# Patient Record
Sex: MALE | Race: WHITE | NOT HISPANIC OR LATINO | Employment: OTHER | URBAN - METROPOLITAN AREA
[De-identification: names, ages, dates, MRNs, and addresses within clinical notes are randomized per-mention and may not be internally consistent; named-entity substitution may affect disease eponyms.]

---

## 2023-09-14 PROBLEM — R79.89 ELEVATED LIVER FUNCTION TESTS: Status: ACTIVE | Noted: 2023-09-14

## 2023-09-14 PROBLEM — E78.5 HLD (HYPERLIPIDEMIA): Status: ACTIVE | Noted: 2023-09-14

## 2023-09-14 PROBLEM — E11.3299 MILD NONPROLIFERATIVE DIABETIC RETINOPATHY WITHOUT MACULAR EDEMA ASSOCIATED WITH TYPE 2 DIABETES MELLITUS (MULTI): Status: ACTIVE | Noted: 2023-09-14

## 2023-09-14 PROBLEM — I65.29 INTERNAL CAROTID ARTERY STENOSIS: Status: ACTIVE | Noted: 2023-09-14

## 2023-09-14 PROBLEM — I48.91 ATRIAL FIBRILLATION (MULTI): Status: ACTIVE | Noted: 2023-09-14

## 2023-09-14 PROBLEM — K22.70 BARRETT ESOPHAGUS: Status: ACTIVE | Noted: 2023-09-14

## 2023-09-14 PROBLEM — I87.2 VENOUS INSUFFICIENCY: Status: ACTIVE | Noted: 2023-09-14

## 2023-09-14 PROBLEM — E55.9 VITAMIN D DEFICIENCY: Status: ACTIVE | Noted: 2023-09-14

## 2023-09-14 PROBLEM — E11.51 DIABETES MELLITUS WITH PERIPHERAL ARTERY DISEASE (MULTI): Status: ACTIVE | Noted: 2023-09-14

## 2023-09-14 PROBLEM — I83.90 VARICOSE VEIN OF LEG: Status: ACTIVE | Noted: 2023-09-14

## 2023-09-14 PROBLEM — E80.6 HYPERBILIRUBINEMIA: Status: ACTIVE | Noted: 2023-09-14

## 2023-09-14 PROBLEM — I87.8 VENOUS STASIS: Status: ACTIVE | Noted: 2023-09-14

## 2023-09-14 PROBLEM — I77.9 CAROTID ARTERY DISEASE (CMS-HCC): Status: ACTIVE | Noted: 2023-09-14

## 2023-09-14 PROBLEM — G47.33 OSA ON CPAP: Status: ACTIVE | Noted: 2023-09-14

## 2023-09-14 PROBLEM — D64.9 ANEMIA: Status: ACTIVE | Noted: 2023-09-14

## 2023-09-14 PROBLEM — M10.9 GOUT: Status: ACTIVE | Noted: 2023-09-14

## 2023-09-14 PROBLEM — I07.1 TRICUSPID VALVE REGURGITATION: Status: ACTIVE | Noted: 2023-09-14

## 2023-09-14 PROBLEM — I27.20 PULMONARY HYPERTENSION (MULTI): Status: ACTIVE | Noted: 2023-09-14

## 2023-09-14 PROBLEM — I50.30 DIASTOLIC HEART FAILURE (MULTI): Status: ACTIVE | Noted: 2023-09-14

## 2023-09-14 PROBLEM — M10.9 GOUTY BURSITIS OF LEFT OLECRANON: Status: ACTIVE | Noted: 2023-09-14

## 2023-09-14 PROBLEM — K70.30 ALCOHOLIC CIRRHOSIS (MULTI): Status: ACTIVE | Noted: 2023-09-14

## 2023-09-14 PROBLEM — Z99.89 USE OF CANE AS AMBULATORY AID: Status: ACTIVE | Noted: 2023-09-14

## 2023-09-14 PROBLEM — I34.0 CHRONIC MITRAL VALVE REGURGITATION: Status: ACTIVE | Noted: 2023-09-14

## 2023-09-14 PROBLEM — M10.9 GOUTY BURSITIS OF RIGHT OLECRANON: Status: ACTIVE | Noted: 2023-09-14

## 2023-09-14 PROBLEM — I10 HYPERTENSION: Status: ACTIVE | Noted: 2023-09-14

## 2023-09-14 RX ORDER — LISINOPRIL 40 MG/1
1 TABLET ORAL DAILY
COMMUNITY
End: 2024-05-31 | Stop reason: SDUPTHER

## 2023-09-14 RX ORDER — ATORVASTATIN CALCIUM 40 MG/1
1 TABLET, FILM COATED ORAL DAILY
COMMUNITY
End: 2024-05-31 | Stop reason: SDUPTHER

## 2023-09-14 RX ORDER — CHOLECALCIFEROL (VITAMIN D3) 50 MCG
1 TABLET ORAL DAILY
COMMUNITY

## 2023-09-14 RX ORDER — MULTIVITAMIN
1 TABLET ORAL DAILY
COMMUNITY

## 2023-09-14 RX ORDER — GLIPIZIDE AND METFORMIN HCL 5; 500 MG/1; MG/1
1 TABLET, FILM COATED ORAL 2 TIMES DAILY
COMMUNITY
End: 2023-12-08

## 2023-09-14 RX ORDER — METOPROLOL SUCCINATE 200 MG/1
1 TABLET, EXTENDED RELEASE ORAL DAILY
COMMUNITY
End: 2024-02-13 | Stop reason: SDUPTHER

## 2023-09-14 RX ORDER — COLCHICINE 0.6 MG/1
1 TABLET ORAL DAILY
COMMUNITY
End: 2024-04-15 | Stop reason: SDUPTHER

## 2023-09-14 RX ORDER — ALLOPURINOL 100 MG/1
1 TABLET ORAL DAILY
COMMUNITY
End: 2023-12-05

## 2023-09-14 RX ORDER — WARFARIN SODIUM 5 MG/1
1 TABLET ORAL
COMMUNITY
End: 2023-12-30 | Stop reason: SDUPTHER

## 2023-09-14 RX ORDER — SPIRONOLACTONE 25 MG/1
1 TABLET ORAL DAILY
COMMUNITY
End: 2023-12-08

## 2023-09-14 RX ORDER — FUROSEMIDE 20 MG/1
1 TABLET ORAL DAILY
COMMUNITY
End: 2024-05-31 | Stop reason: SDUPTHER

## 2023-09-14 RX ORDER — PANTOPRAZOLE SODIUM 40 MG/1
1 TABLET, DELAYED RELEASE ORAL
COMMUNITY
End: 2024-01-31

## 2023-10-16 ENCOUNTER — OFFICE VISIT (OUTPATIENT)
Dept: PRIMARY CARE | Facility: CLINIC | Age: 68
End: 2023-10-16
Payer: MEDICARE

## 2023-10-16 VITALS
HEART RATE: 86 BPM | SYSTOLIC BLOOD PRESSURE: 172 MMHG | WEIGHT: 265.9 LBS | HEIGHT: 70 IN | TEMPERATURE: 97.2 F | BODY MASS INDEX: 38.07 KG/M2 | DIASTOLIC BLOOD PRESSURE: 93 MMHG | OXYGEN SATURATION: 97 %

## 2023-10-16 DIAGNOSIS — I48.11 LONGSTANDING PERSISTENT ATRIAL FIBRILLATION (MULTI): ICD-10-CM

## 2023-10-16 DIAGNOSIS — F10.10 ETOH ABUSE: ICD-10-CM

## 2023-10-16 DIAGNOSIS — I27.20 PULMONARY HYPERTENSION (MULTI): ICD-10-CM

## 2023-10-16 DIAGNOSIS — E11.51 DIABETES MELLITUS WITH PERIPHERAL ARTERY DISEASE (MULTI): ICD-10-CM

## 2023-10-16 DIAGNOSIS — Z12.11 COLON CANCER SCREENING: Primary | ICD-10-CM

## 2023-10-16 LAB
POC INR: 4.2
POC PROTHROMBIN TIME: NORMAL

## 2023-10-16 PROCEDURE — 99214 OFFICE O/P EST MOD 30 MIN: CPT | Performed by: NURSE PRACTITIONER

## 2023-10-16 PROCEDURE — 82570 ASSAY OF URINE CREATININE: CPT

## 2023-10-16 PROCEDURE — 3062F POS MACROALBUMINURIA REV: CPT | Performed by: NURSE PRACTITIONER

## 2023-10-16 PROCEDURE — 1036F TOBACCO NON-USER: CPT | Performed by: NURSE PRACTITIONER

## 2023-10-16 PROCEDURE — 85610 PROTHROMBIN TIME: CPT | Performed by: NURSE PRACTITIONER

## 2023-10-16 PROCEDURE — 82043 UR ALBUMIN QUANTITATIVE: CPT

## 2023-10-16 PROCEDURE — 3080F DIAST BP >= 90 MM HG: CPT | Performed by: NURSE PRACTITIONER

## 2023-10-16 PROCEDURE — 1159F MED LIST DOCD IN RCRD: CPT | Performed by: NURSE PRACTITIONER

## 2023-10-16 PROCEDURE — 4010F ACE/ARB THERAPY RXD/TAKEN: CPT | Performed by: NURSE PRACTITIONER

## 2023-10-16 PROCEDURE — 3077F SYST BP >= 140 MM HG: CPT | Performed by: NURSE PRACTITIONER

## 2023-10-16 PROCEDURE — 1170F FXNL STATUS ASSESSED: CPT | Performed by: NURSE PRACTITIONER

## 2023-10-16 PROCEDURE — 1160F RVW MEDS BY RX/DR IN RCRD: CPT | Performed by: NURSE PRACTITIONER

## 2023-10-16 RX ORDER — FOLIC ACID 0.8 MG
0.8 TABLET ORAL DAILY
COMMUNITY

## 2023-10-16 RX ORDER — WARFARIN 2 MG/1
4 TABLET ORAL EVERY EVENING
Qty: 60 TABLET | Refills: 0 | Status: SHIPPED | OUTPATIENT
Start: 2023-10-16 | End: 2023-11-08

## 2023-10-16 ASSESSMENT — ENCOUNTER SYMPTOMS
ABDOMINAL DISTENTION: 0
EYE ITCHING: 0
CHILLS: 0
EYE PAIN: 0
WHEEZING: 0
PALPITATIONS: 0
COUGH: 0
NUMBNESS: 0
HEMATURIA: 0
CONSTIPATION: 0
DIARRHEA: 0
DYSURIA: 0
FREQUENCY: 0
ARTHRALGIAS: 0
SHORTNESS OF BREATH: 0
NERVOUS/ANXIOUS: 0
ABDOMINAL PAIN: 0
WOUND: 0
UNEXPECTED WEIGHT CHANGE: 0
ACTIVITY CHANGE: 0
VOMITING: 0
APPETITE CHANGE: 0
FEVER: 0

## 2023-10-16 ASSESSMENT — ACTIVITIES OF DAILY LIVING (ADL)
DRESSING: INDEPENDENT
MANAGING_FINANCES: INDEPENDENT
TAKING_MEDICATION: INDEPENDENT
DOING_HOUSEWORK: INDEPENDENT
BATHING: INDEPENDENT
GROCERY_SHOPPING: INDEPENDENT

## 2023-10-16 ASSESSMENT — PATIENT HEALTH QUESTIONNAIRE - PHQ9
SUM OF ALL RESPONSES TO PHQ9 QUESTIONS 1 AND 2: 0
1. LITTLE INTEREST OR PLEASURE IN DOING THINGS: NOT AT ALL
2. FEELING DOWN, DEPRESSED OR HOPELESS: NOT AT ALL

## 2023-10-16 NOTE — PATIENT INSTRUCTIONS
Obtain Liver US   Decrease Alcohol intake   Establish w cardiologist and pulmonologist   Monitor Blood pressure at home and call me with result on Friday.   Please return in 2 weeks for INR check

## 2023-10-16 NOTE — ASSESSMENT & PLAN NOTE
Managed on pantoprazole 40 mg daily, may take an extra dose if heartburn symptoms persist.  Advised by GI doctor to elevate the head of bed 6 to 8 inches, have an early dinner at least 3 hours before sleep, eat small frequent meals, avoid laying down after meals, avoid spices juices soda tomatoes ry oranges caffeine, chocolate, alcohol, NSAIDs, smoking, fatty foods, peppermint  Plan for EGD once patient receives clearance from cardiology and pulmonary

## 2023-10-16 NOTE — ASSESSMENT & PLAN NOTE
Chronic, stable  -Managed on warfarin-warfarin monitored monthly in office  -Referred to cardiology

## 2023-10-16 NOTE — ASSESSMENT & PLAN NOTE
7/23 carotid artery ultrasound completed-Mild calcified plaques at the proximal internal carotid arteries  bilaterally but without hemodynamic significance. no further follow-up needed.

## 2023-10-16 NOTE — PROGRESS NOTES
Subjective   Reason for Visit: Ricardo Chew is an 68 y.o. male here for a Medicare Wellness visit. Upon review patient states he already completed Medicare wellness visit w Health insurance provider therefore this visit will be a follow up of DM and Afib (warfarin management) and  which he is due for as well.      Ricardo continues on glipizide/Metformin for glucose control.  Is well managed on this regimen.  He does not monitor BS at home.  Ricardo was seen by Optho 6/27/23 and established w podiatrist Dr Ger Vega 9/28/23.    Ambulates w walker. Lives in Assisted living.  No falls. Participates in exercise at [facility].      Since last visit Ricardo was evaluated by GI for Barrets esophagus.  He was started on PPI and plans to complete EGD once he receives clearance from Cardiology and Pulmonology (referrals placed previously by GI but Ricardo had not completed yet).  Ricardo is actively establishing with providers in the area but has moved recently from [place] and is slightly overwhelmed w many healthcare appts.    He also had cataract surgery this summer.     Review of Systems   Constitutional:  Negative for activity change (ambulates w walker), appetite change, chills, fever and unexpected weight change.   HENT:  Negative for congestion.    Eyes:  Negative for pain and itching.   Respiratory:  Negative for cough, shortness of breath and wheezing.    Cardiovascular:  Negative for chest pain, palpitations and leg swelling.   Gastrointestinal:  Negative for abdominal distention, abdominal pain, constipation, diarrhea and vomiting.   Genitourinary:  Negative for dysuria, frequency, hematuria and urgency.        Nocturia once or twice- chronic   Musculoskeletal:  Negative for arthralgias and gait problem.   Skin:  Negative for rash and wound.   Neurological:  Negative for numbness.   Psychiatric/Behavioral:  The patient is not nervous/anxious.        Objective   Vitals:  BP (!) 172/93   Pulse 86   Temp 36.2 °C  "(97.2 °F)   Ht 1.778 m (5' 10\")   Wt 121 kg (265 lb 14.4 oz)   SpO2 97%   BMI 38.15 kg/m²       Physical Exam  Vitals and nursing note reviewed.   Constitutional:       Appearance: Normal appearance. He is obese.   HENT:      Head: Normocephalic and atraumatic.      Nose: Nose normal.      Mouth/Throat:      Mouth: Mucous membranes are moist.      Pharynx: Oropharynx is clear.   Eyes:      Extraocular Movements: Extraocular movements intact.      Conjunctiva/sclera: Conjunctivae normal.      Pupils: Pupils are equal, round, and reactive to light.   Cardiovascular:      Rate and Rhythm: Normal rate. Rhythm irregular.      Pulses: Normal pulses.      Heart sounds: Normal heart sounds.   Pulmonary:      Effort: Pulmonary effort is normal.      Breath sounds: Normal breath sounds.   Abdominal:      General: Bowel sounds are normal.      Palpations: Abdomen is soft.      Tenderness: There is no abdominal tenderness.   Musculoskeletal:         General: Normal range of motion.      Right elbow: Deformity present.      Left elbow: Deformity present.      Cervical back: Neck supple.      Comments:  +1B LE edema, B shins w dark discolorationn (venous stasis), B LE w vericose veins. No open areas. R heel callus- intact. no open lesion on B feet, Gouty bursitis B olecranon    Skin:     General: Skin is warm and dry.   Neurological:      General: No focal deficit present.      Mental Status: He is alert and oriented to person, place, and time. Mental status is at baseline.   Psychiatric:         Mood and Affect: Mood normal. Affect is flat.         Behavior: Behavior normal.         Thought Content: Thought content normal.         Judgment: Judgment normal.       Anticoagulation Episode Summary       Current INR goal:     TTR:  --   Next INR check:  10/30/2023   INR from last check:     Weekly max warfarin dose:     Target end date:     INR check location:     Preferred lab:     Send INR reminders to:         Comments:      "          Anticoagulation Monitoring Last Week Total Next Week Total Sun Mon Tue Wed Thu Fri Sat   10/16/2023 0 mg 24 mg 4 mg 4 mg 4 mg 4 mg 4 mg 4 mg 4 mg     Anticoagulation Monitoring Visit Report   10/16/2023 Report      Assessment/Plan   Problem List Items Addressed This Visit       Atrial fibrillation (CMS/HCC)    Overview     -afib diagnosed 2008  -6/28/2018 Pharm Stress test -normal cardiac nuclear perfusion study except for fixed defect likely due to attenuation artifact.  Normal left ventricular systolic function EF 75%         Current Assessment & Plan     Chronic, stable  -Managed on warfarin-warfarin monitored monthly in office  -Referred to cardiology         Relevant Medications    warfarin (Coumadin) 2 mg tablet    Other Relevant Orders    Referral to Cardiology    POCT INR manually resulted (Completed)    Diabetes mellitus with peripheral artery disease (CMS/HCC)    Current Assessment & Plan     Chronic, stable on current medication glipizide/metformin  -Seen by Optho 6/27/23  -Referred to podiatry- Ger Snell 9/28/23, next visit 11/30  -A1c: 7/17/23  5.5   Microalbumin- ordered today          Relevant Orders    Albumin , Urine Random (Completed)    Hemoglobin A1C    POCT INR manually resulted (Completed)    Pulmonary hypertension (CMS/HCC)    Relevant Orders    Incentive spirometry Instruct    Referral to Pulmonology    POCT INR manually resulted (Completed)    Colon cancer screening - Primary    Overview     Cologkenrick 8/2023-unremarkable         Relevant Orders    POCT INR manually resulted (Completed)    ETOH abuse    Overview     Alcohol cessation advised, referred to an alcoholic cessation program by Dr. Mateo Nicole           Current Assessment & Plan     Continues on multivitamin, thiamine, folic acid         Relevant Orders    POCT INR manually resulted (Completed)

## 2023-10-16 NOTE — ASSESSMENT & PLAN NOTE
Chronic, stable on current medication glipizide/metformin  -Seen by Optho 6/27/23  -Referred to podiatry- Ger Snell 9/28/23, next visit 11/30  -A1c: 7/17/23  5.5   Microalbumin- ordered today

## 2023-10-17 LAB
CREAT UR-MCNC: 45.8 MG/DL (ref 20–370)
MICROALBUMIN UR-MCNC: 342 MG/L
MICROALBUMIN/CREAT UR: 746.7 UG/MG CREAT

## 2023-10-20 DIAGNOSIS — R80.9 ALBUMINURIA: Primary | ICD-10-CM

## 2023-10-20 DIAGNOSIS — E11.51 DIABETES MELLITUS WITH PERIPHERAL ARTERY DISEASE (MULTI): ICD-10-CM

## 2023-10-26 ENCOUNTER — OFFICE VISIT (OUTPATIENT)
Dept: CARDIOLOGY | Facility: CLINIC | Age: 68
End: 2023-10-26
Payer: MEDICARE

## 2023-10-26 VITALS
TEMPERATURE: 97.3 F | WEIGHT: 267 LBS | BODY MASS INDEX: 39.55 KG/M2 | SYSTOLIC BLOOD PRESSURE: 132 MMHG | HEART RATE: 85 BPM | HEIGHT: 69 IN | DIASTOLIC BLOOD PRESSURE: 88 MMHG

## 2023-10-26 DIAGNOSIS — E78.2 MIXED HYPERLIPIDEMIA: ICD-10-CM

## 2023-10-26 DIAGNOSIS — I48.11 LONGSTANDING PERSISTENT ATRIAL FIBRILLATION (MULTI): Primary | ICD-10-CM

## 2023-10-26 DIAGNOSIS — I34.0 CHRONIC MITRAL VALVE REGURGITATION: ICD-10-CM

## 2023-10-26 PROCEDURE — 1126F AMNT PAIN NOTED NONE PRSNT: CPT | Performed by: INTERNAL MEDICINE

## 2023-10-26 PROCEDURE — 1159F MED LIST DOCD IN RCRD: CPT | Performed by: INTERNAL MEDICINE

## 2023-10-26 PROCEDURE — 1160F RVW MEDS BY RX/DR IN RCRD: CPT | Performed by: INTERNAL MEDICINE

## 2023-10-26 PROCEDURE — 4010F ACE/ARB THERAPY RXD/TAKEN: CPT | Performed by: INTERNAL MEDICINE

## 2023-10-26 PROCEDURE — 1036F TOBACCO NON-USER: CPT | Performed by: INTERNAL MEDICINE

## 2023-10-26 PROCEDURE — 99204 OFFICE O/P NEW MOD 45 MIN: CPT | Performed by: INTERNAL MEDICINE

## 2023-10-26 PROCEDURE — 3062F POS MACROALBUMINURIA REV: CPT | Performed by: INTERNAL MEDICINE

## 2023-10-26 PROCEDURE — 3075F SYST BP GE 130 - 139MM HG: CPT | Performed by: INTERNAL MEDICINE

## 2023-10-26 PROCEDURE — 3079F DIAST BP 80-89 MM HG: CPT | Performed by: INTERNAL MEDICINE

## 2023-10-26 ASSESSMENT — PAIN SCALES - GENERAL: PAINLEVEL: 0-NO PAIN

## 2023-10-26 NOTE — PROGRESS NOTES
"1.  Atrial fibrillation ?  2.  Hypertension  3.  Diabetes mellitus  4.  Hyperlipidemia  5.  Obesity  6.  Alcoholic cirrhosis    Patient is a pleasant 68-year-old male with the above-noted pertinent past medical history was been referred by the primary care physician for assessment of atrial fibrillation and hypertension prior for cardiovascular risk stratification for EGD, he has no complaints of palpitation, he denies any complaints of orthopnea PND or syncopal episode    Past surgical history  Bilateral cataract extraction 2023    Family medical history  Father  66 years of age Parkinson's disease and mother  94 years of age \"natural causes \"    Social history   lives in an assisted living situation has no children and has no history of smoking and endorses 3-4 alcoholic beverages per week    Allergies list reviewed as noted in the chart    Medication list is reviewed as noted in the chart    Review of system last eye examination in 2023 at which time he underwent cataract extraction, he consumes 1 cup of coffee per day, has on the average 1 restaurant meals per month, he walks with the help of the walker, all other systems were reviewed and negative for complaints    On physical examination BMI is elevated at 39.4, blood pressure 132/88 mmHg and heart rate of 85 bpm, patient is a well-developed well-nourished obese gentleman in no acute distress speaking full sentence, no carotid bruits upstroke and volumes are normal, heart rate and rhythm irregular frequent ectopic beats versus atrial fibrillation is present, S1 and S2 are normal intensity soft grade 2/6 systolic murmur best right upper and left upper sternal border present, lungs decreased breath sound but clear abdomen is morbidly obese positive bowel sounds soft and nontender    Twelve-lead EKG pending    68-year-old male with the above-noted pertinent history question of atrial fibrillation and further assessment " of hypertension posed by the primary care physician today I will obtain twelve-lead EKG for assessment of cardiac rhythm patient would benefit from echocardiogram for assessment of systolic and diastolic function as well as chamber sizes, patient is also scheduled to have a 24-hour Holter monitor for assessment of the total burden of atrial fibrillation  Heart healthy diet low-sodium diet and abstaining from alcohol use was discussed and recommendations were made  Overweight heart healthy diet and weight loss recommended  Return to my clinic after above tests are completed.

## 2023-10-31 ENCOUNTER — CLINICAL SUPPORT (OUTPATIENT)
Dept: PRIMARY CARE | Facility: CLINIC | Age: 68
End: 2023-10-31
Payer: MEDICARE

## 2023-10-31 DIAGNOSIS — I48.11 LONGSTANDING PERSISTENT ATRIAL FIBRILLATION (MULTI): ICD-10-CM

## 2023-10-31 LAB
POC INR: 1.8
POC PROTHROMBIN TIME: NORMAL

## 2023-11-07 ENCOUNTER — HOSPITAL ENCOUNTER (OUTPATIENT)
Dept: CARDIOLOGY | Facility: CLINIC | Age: 68
Discharge: HOME | End: 2023-11-07
Payer: MEDICARE

## 2023-11-07 ENCOUNTER — APPOINTMENT (OUTPATIENT)
Dept: CARDIOLOGY | Facility: CLINIC | Age: 68
End: 2023-11-07
Payer: MEDICARE

## 2023-11-07 ENCOUNTER — ANCILLARY PROCEDURE (OUTPATIENT)
Dept: CARDIOLOGY | Facility: CLINIC | Age: 68
End: 2023-11-07
Payer: MEDICARE

## 2023-11-07 VITALS
HEIGHT: 69 IN | SYSTOLIC BLOOD PRESSURE: 138 MMHG | BODY MASS INDEX: 39.55 KG/M2 | DIASTOLIC BLOOD PRESSURE: 82 MMHG | WEIGHT: 267 LBS

## 2023-11-07 DIAGNOSIS — I34.0 CHRONIC MITRAL VALVE REGURGITATION: ICD-10-CM

## 2023-11-07 DIAGNOSIS — I48.11 LONGSTANDING PERSISTENT ATRIAL FIBRILLATION (MULTI): ICD-10-CM

## 2023-11-07 DIAGNOSIS — E78.2 MIXED HYPERLIPIDEMIA: ICD-10-CM

## 2023-11-07 PROCEDURE — 93306 TTE W/DOPPLER COMPLETE: CPT | Performed by: INTERNAL MEDICINE

## 2023-11-07 PROCEDURE — 93306 TTE W/DOPPLER COMPLETE: CPT

## 2023-11-07 PROCEDURE — 93227 XTRNL ECG REC<48 HR R&I: CPT | Performed by: INTERNAL MEDICINE

## 2023-11-08 DIAGNOSIS — I48.11 LONGSTANDING PERSISTENT ATRIAL FIBRILLATION (MULTI): ICD-10-CM

## 2023-11-08 PROBLEM — E11.9 DIABETES MELLITUS (MULTI): Status: ACTIVE | Noted: 2023-07-28

## 2023-11-08 PROBLEM — D84.81 IMMUNODEFICIENCY DUE TO CONDITIONS CLASSIFIED ELSEWHERE (MULTI): Status: ACTIVE | Noted: 2023-11-08

## 2023-11-08 PROBLEM — I48.91 A-FIB (MULTI): Status: ACTIVE | Noted: 2023-07-28

## 2023-11-08 LAB
AORTIC VALVE MEAN GRADIENT: 11
AORTIC VALVE PEAK VELOCITY: 2.28
AV PEAK GRADIENT: 20.8
AVA (PEAK VEL): 1.49
AVA (VTI): 1.48
EJECTION FRACTION APICAL 4 CHAMBER: 58.8
LEFT VENTRICLE INTERNAL DIMENSION DIASTOLE: 4.8 (ref 3.5–6)
LEFT VENTRICULAR OUTFLOW TRACT DIAMETER: 2
MITRAL VALVE E/E' RATIO: 11.5
RIGHT VENTRICLE PEAK SYSTOLIC PRESSURE: 57.2

## 2023-11-08 RX ORDER — WARFARIN 2 MG/1
TABLET ORAL
Qty: 180 TABLET | Refills: 1 | Status: SHIPPED | OUTPATIENT
Start: 2023-11-08 | End: 2024-01-03 | Stop reason: SDUPTHER

## 2023-11-14 ENCOUNTER — CLINICAL SUPPORT (OUTPATIENT)
Dept: PRIMARY CARE | Facility: CLINIC | Age: 68
End: 2023-11-14
Payer: MEDICARE

## 2023-11-14 DIAGNOSIS — I48.11 LONGSTANDING PERSISTENT ATRIAL FIBRILLATION (MULTI): ICD-10-CM

## 2023-11-14 LAB
POC INR: 1.5
POC PROTHROMBIN TIME: NORMAL

## 2023-11-14 NOTE — PROGRESS NOTES
Here for INR, takes 4mg daily. INR was 1.5, per Patricia take 4.5 mg daily return to office in two weeks

## 2023-11-17 ENCOUNTER — OFFICE VISIT (OUTPATIENT)
Dept: NEPHROLOGY | Facility: CLINIC | Age: 68
End: 2023-11-17
Payer: MEDICARE

## 2023-11-17 VITALS
SYSTOLIC BLOOD PRESSURE: 182 MMHG | BODY MASS INDEX: 39.25 KG/M2 | WEIGHT: 265 LBS | HEART RATE: 83 BPM | DIASTOLIC BLOOD PRESSURE: 95 MMHG | TEMPERATURE: 97.2 F | HEIGHT: 69 IN

## 2023-11-17 DIAGNOSIS — E08.22 DIABETES MELLITUS DUE TO UNDERLYING CONDITION WITH DIABETIC CHRONIC KIDNEY DISEASE, UNSPECIFIED CKD STAGE, UNSPECIFIED WHETHER LONG TERM INSULIN USE (MULTI): Primary | ICD-10-CM

## 2023-11-17 DIAGNOSIS — E55.9 VITAMIN D DEFICIENCY: ICD-10-CM

## 2023-11-17 DIAGNOSIS — E11.51 DIABETES MELLITUS WITH PERIPHERAL ARTERY DISEASE (MULTI): ICD-10-CM

## 2023-11-17 DIAGNOSIS — R80.9 ALBUMINURIA: ICD-10-CM

## 2023-11-17 DIAGNOSIS — I10 ESSENTIAL HYPERTENSION: ICD-10-CM

## 2023-11-17 PROCEDURE — 3080F DIAST BP >= 90 MM HG: CPT | Performed by: INTERNAL MEDICINE

## 2023-11-17 PROCEDURE — 1126F AMNT PAIN NOTED NONE PRSNT: CPT | Performed by: INTERNAL MEDICINE

## 2023-11-17 PROCEDURE — 3066F NEPHROPATHY DOC TX: CPT | Performed by: INTERNAL MEDICINE

## 2023-11-17 PROCEDURE — 4010F ACE/ARB THERAPY RXD/TAKEN: CPT | Performed by: INTERNAL MEDICINE

## 2023-11-17 PROCEDURE — 1160F RVW MEDS BY RX/DR IN RCRD: CPT | Performed by: INTERNAL MEDICINE

## 2023-11-17 PROCEDURE — 99204 OFFICE O/P NEW MOD 45 MIN: CPT | Performed by: INTERNAL MEDICINE

## 2023-11-17 PROCEDURE — 3062F POS MACROALBUMINURIA REV: CPT | Performed by: INTERNAL MEDICINE

## 2023-11-17 PROCEDURE — 1036F TOBACCO NON-USER: CPT | Performed by: INTERNAL MEDICINE

## 2023-11-17 PROCEDURE — 1159F MED LIST DOCD IN RCRD: CPT | Performed by: INTERNAL MEDICINE

## 2023-11-17 PROCEDURE — 3077F SYST BP >= 140 MM HG: CPT | Performed by: INTERNAL MEDICINE

## 2023-11-17 RX ORDER — AMLODIPINE BESYLATE 5 MG/1
5 TABLET ORAL DAILY
Qty: 90 TABLET | Refills: 3 | Status: SHIPPED | OUTPATIENT
Start: 2023-11-17 | End: 2024-01-31 | Stop reason: SDUPTHER

## 2023-11-17 RX ORDER — DAPAGLIFLOZIN 10 MG/1
10 TABLET, FILM COATED ORAL DAILY
Qty: 90 TABLET | Refills: 3 | Status: SHIPPED | OUTPATIENT
Start: 2023-11-17 | End: 2024-11-16

## 2023-11-17 ASSESSMENT — PATIENT HEALTH QUESTIONNAIRE - PHQ9
SUM OF ALL RESPONSES TO PHQ9 QUESTIONS 1 AND 2: 0
2. FEELING DOWN, DEPRESSED OR HOPELESS: NOT AT ALL
1. LITTLE INTEREST OR PLEASURE IN DOING THINGS: NOT AT ALL

## 2023-11-17 NOTE — PROGRESS NOTES
Ricardo Chew   68 y.o.      Vitals:    11/17/23 1305   Weight: 120 kg (265 lb)      MRN/Room: [id number]/Room/bed info not found    Proteinuria  History Of Present Illness  Ricardo Chew is a 68 y.o. male presenting with proteinuria.     Past Medical History  He has no past medical history on file.    Surgical History  He has a past surgical history that includes Cataract extraction.     Social History  He reports that he quit smoking about 50 years ago. His smoking use included cigarettes. He smoked an average of 1 pack per day. He has never been exposed to tobacco smoke. He has never used smokeless tobacco. He reports current alcohol use. He reports that he does not use drugs.    Family History  Family History   Problem Relation Name Age of Onset    Hypertension Father      Heart failure Father      Parkinsonism Father      Other (Cardiac Disorder) Brother      Other (Cardiac Pacemaker) Brother          Allergies  Ketorolac                  Meds:         @[email or web address]@    Cannot display prior to admission medications because the patient has not been admitted in this contact.     Current Outpatient Medications   Medication Sig Dispense Refill    allopurinol (Zyloprim) 100 mg tablet Take 1 tablet (100 mg) by mouth once daily. Take 1 tablet daily      atorvastatin (Lipitor) 40 mg tablet Take 1 tablet (40 mg) by mouth once daily. TAKE 1 TABLET Before meals      cholecalciferol (Vitamin D-3) 50 MCG (2000 UT) tablet Take 1 tablet (2,000 Units) by mouth once daily.      colchicine, gout, 0.6 mg tablet Take 1 tablet (0.6 mg) by mouth once daily. Take 1 tablet daily      folic acid (Folvite) 800 mcg tablet Take 1 tablet (0.8 mg) by mouth once daily.      furosemide (Lasix) 20 mg tablet Take 1 tablet (20 mg) by mouth once daily. Take 1 tablet daily      glipizide-metformin (Metaglip) 5-500 mg tablet Take 1 tablet by mouth 2 times a day. Take 1 tablet twice daily      lisinopril 40 mg tablet Take 1 tablet (40 mg) by mouth  once daily. Take 1 tablet daily      metoprolol succinate XL (Toprol-XL) 200 mg 24 hr tablet Take 1 tablet (200 mg) by mouth once daily. Do not crush or chew. - Take 1 tablet daily      multivitamin tablet Take 1 tablet by mouth once daily.      pantoprazole (Protonix) 40 mg EC tablet Take 1 tablet (40 mg) by mouth once daily in the morning. Take before meals. Do not crush, chew, or split. - TAKE 1 TABLET BY MOUTH EVERY DAY      spironolactone (Aldactone) 25 mg tablet Take 1 tablet (25 mg) by mouth once daily. Take 1 tablet daily      warfarin (Coumadin) 2 mg tablet TAKE 2 TABLETS BY MOUTH ONCE DAILY IN THE EVENING. TAKE AS DIRECTED PER AFTER VISIT SUMMARY. 180 tablet 1    warfarin (Coumadin) 5 mg tablet Take 1 tablet (5 mg) by mouth. Take as directed per After Visit Summary. - Take 1 tablet daily       No current facility-administered medications for this visit.         ROS:  The patient is awake and oriented. No dizziness or lightheadedness. No chills and no fever. No headaches. No nausea and no vomiting. No shortness of breath. No cough. No sputum. No chest pain. No chest tightness. No abdominal pain. No diarrhea and no constipation. No hematemesis or hemoptysis. No hematuria. No rectal bleeding. No melena. No epistaxis. No urinary symptoms. No flank pain. No leg edema. No leg pain. No weakness. No itching. Overall, the rest of the review of systems is also negative.  12 point review of systems otherwise negative as stated in HPI.        Physical Exam:        Vitals:    11/17/23 1305   BP: (!) 182/95   Pulse: 83   Temp: 36.2 °C (97.2 °F)     General: The patient is awake, oriented, and is not in any distress.  Head and Neck: Normocephalic. No periorbital edema.  Respiratory: Symmetric air entry. Symmetric chest expansion.No respiratory distress.  Cardiovascular: Symmetric peripheral pulses.  Skin: No maculopapular rash.  Musculoskeletal: No peripheral edema in both left and right upper extremities.  No edema in  "either left or right lower extremities.  Neuro Exam: Speech is fluent. Moves extremities.        Blood Labs:  No results found for this or any previous visit (from the past 24 hour(s)).   No results found for: \"GLUCOSE\", \"CALCIUM\", \"NA\", \"K\", \"CO2\", \"CL\", \"BUN\", \"CREATININE\"    Imaging:        Assessment and Plan:  1 proteinuria.  The patient has long history of diabetes.  Last spot urine albumin to creatinine ratio shows more than 700 mg albuminuria.  This is most likely because of diabetic nephropathy.  He is on maximum dose of lisinopril.  I added Farxiga to his medications.  There is no kidney function test in his record.  I ordered a basic metabolic panel.  Microscopic urinalysis will be checked.    2.  Hypertension.  Blood pressure is high.  I added amlodipine to his medications.    2.  Diabetes.    I will see him in about 2 to 3 weeks for follow-up.    Blaine Jackson MD  "

## 2023-11-28 ENCOUNTER — APPOINTMENT (OUTPATIENT)
Dept: PRIMARY CARE | Facility: CLINIC | Age: 68
End: 2023-11-28
Payer: MEDICARE

## 2023-12-01 ENCOUNTER — CLINICAL SUPPORT (OUTPATIENT)
Dept: PRIMARY CARE | Facility: CLINIC | Age: 68
End: 2023-12-01
Payer: MEDICARE

## 2023-12-01 DIAGNOSIS — I48.11 LONGSTANDING PERSISTENT ATRIAL FIBRILLATION (MULTI): ICD-10-CM

## 2023-12-01 LAB
POC INR: 2.1
POC PROTHROMBIN TIME: NORMAL

## 2023-12-01 NOTE — PROGRESS NOTES
Here for INR. Takes Warfarin 4.5mg daily. Was 2.1, per Patricia Continue on 4.5mg and return to office in 1 month   7

## 2023-12-05 DIAGNOSIS — Z99.89 DEPENDENCE ON OTHER ENABLING MACHINES AND DEVICES: ICD-10-CM

## 2023-12-05 DIAGNOSIS — G47.33 OBSTRUCTIVE SLEEP APNEA (ADULT) (PEDIATRIC): ICD-10-CM

## 2023-12-05 LAB — BODY SURFACE AREA: 2.42 M2

## 2023-12-05 RX ORDER — ALLOPURINOL 100 MG/1
100 TABLET ORAL DAILY
Qty: 90 TABLET | Refills: 1 | Status: SHIPPED | OUTPATIENT
Start: 2023-12-05 | End: 2024-05-30

## 2023-12-08 DIAGNOSIS — E11.51 TYPE 2 DIABETES MELLITUS WITH DIABETIC PERIPHERAL ANGIOPATHY WITHOUT GANGRENE (MULTI): ICD-10-CM

## 2023-12-08 DIAGNOSIS — G47.33 OBSTRUCTIVE SLEEP APNEA (ADULT) (PEDIATRIC): ICD-10-CM

## 2023-12-08 RX ORDER — GLIPIZIDE AND METFORMIN HCL 5; 500 MG/1; MG/1
1 TABLET, FILM COATED ORAL 2 TIMES DAILY
Qty: 180 TABLET | Refills: 2 | Status: SHIPPED | OUTPATIENT
Start: 2023-12-08 | End: 2024-05-31 | Stop reason: SDUPTHER

## 2023-12-08 RX ORDER — SPIRONOLACTONE 25 MG/1
25 TABLET ORAL DAILY
Qty: 90 TABLET | Refills: 1 | Status: SHIPPED | OUTPATIENT
Start: 2023-12-08 | End: 2024-01-08 | Stop reason: ALTCHOICE

## 2023-12-14 ENCOUNTER — APPOINTMENT (OUTPATIENT)
Dept: CARDIOLOGY | Facility: CLINIC | Age: 68
End: 2023-12-14
Payer: MEDICARE

## 2023-12-28 ENCOUNTER — OFFICE VISIT (OUTPATIENT)
Dept: CARDIOLOGY | Facility: CLINIC | Age: 68
End: 2023-12-28
Payer: MEDICARE

## 2023-12-28 VITALS
BODY MASS INDEX: 39.4 KG/M2 | DIASTOLIC BLOOD PRESSURE: 60 MMHG | TEMPERATURE: 98 F | SYSTOLIC BLOOD PRESSURE: 142 MMHG | HEART RATE: 80 BPM | WEIGHT: 266 LBS | HEIGHT: 69 IN

## 2023-12-28 DIAGNOSIS — I36.1 NONRHEUMATIC TRICUSPID VALVE REGURGITATION: ICD-10-CM

## 2023-12-28 DIAGNOSIS — I10 PRIMARY HYPERTENSION: ICD-10-CM

## 2023-12-28 DIAGNOSIS — I48.11 LONGSTANDING PERSISTENT ATRIAL FIBRILLATION (MULTI): Primary | ICD-10-CM

## 2023-12-28 PROCEDURE — 4010F ACE/ARB THERAPY RXD/TAKEN: CPT | Performed by: INTERNAL MEDICINE

## 2023-12-28 PROCEDURE — 99214 OFFICE O/P EST MOD 30 MIN: CPT | Performed by: INTERNAL MEDICINE

## 2023-12-28 PROCEDURE — 3062F POS MACROALBUMINURIA REV: CPT | Performed by: INTERNAL MEDICINE

## 2023-12-28 PROCEDURE — 1159F MED LIST DOCD IN RCRD: CPT | Performed by: INTERNAL MEDICINE

## 2023-12-28 PROCEDURE — 1160F RVW MEDS BY RX/DR IN RCRD: CPT | Performed by: INTERNAL MEDICINE

## 2023-12-28 PROCEDURE — 3077F SYST BP >= 140 MM HG: CPT | Performed by: INTERNAL MEDICINE

## 2023-12-28 PROCEDURE — 3066F NEPHROPATHY DOC TX: CPT | Performed by: INTERNAL MEDICINE

## 2023-12-28 PROCEDURE — 1036F TOBACCO NON-USER: CPT | Performed by: INTERNAL MEDICINE

## 2023-12-28 PROCEDURE — 3078F DIAST BP <80 MM HG: CPT | Performed by: INTERNAL MEDICINE

## 2023-12-28 PROCEDURE — 1126F AMNT PAIN NOTED NONE PRSNT: CPT | Performed by: INTERNAL MEDICINE

## 2023-12-28 NOTE — PROGRESS NOTES
"  1.  Atrial fibrillation persistent CHADSVASc 4  2.  Hypertension  3.  Diabetes mellitus  4.  Hyperlipidemia  5.  Obesity  6.  Alcoholic cirrhosis        Patient is a pleasant 68-year-old male with the above-noted pertinent past medical history who presents today for follow-up he states that he recalls being told about atrial fibrillation over 7 years ago for which she has been since Coumadin therapy, he expresses interest in switching to \"newer medications \", he has no complaints of palpitation, he denies complaints of orthopnea PND or syncopal episodes, he states that he has been compliant with his medications Coumadin although he describes some difficulty with following strict diet.    Vital signs reviewed and stable BMI 39.3  Blood pressure today is better at 142/60 mmHg and heart rate of 80 bpm  Elderly male in no acute distress speaking full sentences no carotid bruits upstroke and volumes are normal heart irregularly irregular S1 variable S2 normal no gallop or murmurs appreciated lungs decreased breath sound but clear abdomen is morbidly obese positive bowel sounds soft and nontender    October 2023  Holter monitor showed predominant rhythm atrial fibrillation average heart rate 70 bpm, no significant ventricular ectopy,  Echocardiography showed normal LV systolic function LVEF 60+/- 5% left atrium mildly enlarged evidence of moderate left ventricular hypertrophy and mild-moderate tricuspid regurgitation    Atrial fibrillation persistent he recalls being in atrial fibrillation and Coumadin therapy for at least the past 7 years, I discussed elective cardioversion with the patient however I also noted that the chance of remaining sinus rhythm in the light of his prolonged duration of atrial fibrillation is low he wishes not to proceed with the procedure, patient wishes to have Coumadin switched to DOACs, patient was instructed to check with insurance company and if it is a covered medication to notify my " office for switching,  Overweight heart healthy diet and weight loss was recommended  Patient is to return to my clinic in 3 months for reevaluation.

## 2023-12-29 ENCOUNTER — CLINICAL SUPPORT (OUTPATIENT)
Dept: PRIMARY CARE | Facility: CLINIC | Age: 68
End: 2023-12-29
Payer: MEDICARE

## 2023-12-29 ENCOUNTER — TELEPHONE (OUTPATIENT)
Dept: PRIMARY CARE | Facility: CLINIC | Age: 68
End: 2023-12-29

## 2023-12-29 DIAGNOSIS — I48.11 LONGSTANDING PERSISTENT ATRIAL FIBRILLATION (MULTI): ICD-10-CM

## 2023-12-29 LAB
POC INR: 1.4
POC PROTHROMBIN TIME: NORMAL

## 2023-12-29 NOTE — PROGRESS NOTES
Here for INR takes 4.5 mg daily. INR was 1.4, per Dr Noonan Take 7.5mg daily and return to office at next approximant with Patricia 1/17/2024.

## 2023-12-30 DIAGNOSIS — I48.11 LONGSTANDING PERSISTENT ATRIAL FIBRILLATION (MULTI): Primary | ICD-10-CM

## 2023-12-30 RX ORDER — WARFARIN SODIUM 5 MG/1
5 TABLET ORAL EVERY EVENING
Qty: 90 TABLET | Refills: 3 | Status: SHIPPED | OUTPATIENT
Start: 2023-12-30 | End: 2024-01-03 | Stop reason: SDUPTHER

## 2024-01-03 ENCOUNTER — TELEPHONE (OUTPATIENT)
Dept: CARDIOLOGY | Facility: CLINIC | Age: 69
End: 2024-01-03
Payer: MEDICARE

## 2024-01-03 DIAGNOSIS — I48.20 CHRONIC ATRIAL FIBRILLATION (MULTI): Primary | ICD-10-CM

## 2024-01-03 DIAGNOSIS — E87.5 HYPERKALEMIA: Primary | ICD-10-CM

## 2024-01-03 LAB
NON-UH HIE BUN/CREAT RATIO: 25.4
NON-UH HIE BUN: 33 MG/DL (ref 9–23)
NON-UH HIE CALCIUM: 10 MG/DL (ref 8.7–10.4)
NON-UH HIE CALCULATED OSMOLALITY: 271 MOSM/KG (ref 275–295)
NON-UH HIE CHLORIDE: 97 MMOL/L (ref 98–107)
NON-UH HIE CO2, VENOUS: 26 MMOL/L (ref 20–31)
NON-UH HIE CREATININE RANDOM, U: 15 MG/DL
NON-UH HIE CREATININE, URINE MG/DL: 15.2 MG/DL
NON-UH HIE CREATININE: 1.3 MG/DL (ref 0.6–1.1)
NON-UH HIE GFR AA: >60
NON-UH HIE GLOMERULAR FILTRATION RATE: 55 ML/MIN/1.73M?
NON-UH HIE GLUCOSE: 217 MG/DL (ref 74–106)
NON-UH HIE I-PTH: 43 PG/ML (ref 18.4–80.1)
NON-UH HIE K: 5.8 MMOL/L (ref 3.5–5.1)
NON-UH HIE MICROALBUMIN, URINE MG/L: 24 MG/L
NON-UH HIE MICROALBUMIN/CREATININE RATIO: 158 MG MALB/GM CREAT (ref 0–30)
NON-UH HIE NA: 128 MMOL/L (ref 135–145)
NON-UH HIE TOTAL PROTEIN, RANDOM URINE: <6 MG/DL (ref 1–14)
NON-UH HIE VIT D 25: 29 NG/ML

## 2024-01-08 ENCOUNTER — TELEPHONE (OUTPATIENT)
Dept: PRIMARY CARE | Facility: CLINIC | Age: 69
End: 2024-01-08
Payer: MEDICARE

## 2024-01-08 NOTE — TELEPHONE ENCOUNTER
----- Message from Blaine Jackson MD sent at 1/8/2024 10:44 AM EST -----  1- High K level: He needs to stop Sprinolactone. I also sent prescription for Lokelma and his BMP needs to be repeated in 3-4 days.  2 Low Na level: Repeat BMP in 3-4 days.

## 2024-01-10 LAB
NON-UH HIE BUN/CREAT RATIO: 21.7
NON-UH HIE BUN: 26 MG/DL (ref 9–23)
NON-UH HIE CALCIUM: 9.7 MG/DL (ref 8.7–10.4)
NON-UH HIE CALCULATED OSMOLALITY: 277 MOSM/KG (ref 275–295)
NON-UH HIE CHLORIDE: 100 MMOL/L (ref 98–107)
NON-UH HIE CO2, VENOUS: 24 MMOL/L (ref 20–31)
NON-UH HIE CREATININE: 1.2 MG/DL (ref 0.6–1.1)
NON-UH HIE GFR AA: >60
NON-UH HIE GLOMERULAR FILTRATION RATE: >60 ML/MIN/1.73M?
NON-UH HIE GLUCOSE: 196 MG/DL (ref 74–106)
NON-UH HIE K: 4.4 MMOL/L (ref 3.5–5.1)
NON-UH HIE NA: 133 MMOL/L (ref 135–145)

## 2024-01-10 NOTE — TELEPHONE ENCOUNTER
Pt called wanting to know if he should stop spironolactone until next ov or just while he was taking lokelma.

## 2024-01-17 ENCOUNTER — APPOINTMENT (OUTPATIENT)
Dept: PRIMARY CARE | Facility: CLINIC | Age: 69
End: 2024-01-17
Payer: MEDICARE

## 2024-01-31 ENCOUNTER — LAB (OUTPATIENT)
Dept: LAB | Facility: LAB | Age: 69
End: 2024-01-31
Payer: MEDICARE

## 2024-01-31 ENCOUNTER — OFFICE VISIT (OUTPATIENT)
Dept: PRIMARY CARE | Facility: CLINIC | Age: 69
End: 2024-01-31
Payer: MEDICARE

## 2024-01-31 VITALS
OXYGEN SATURATION: 97 % | WEIGHT: 272.7 LBS | SYSTOLIC BLOOD PRESSURE: 153 MMHG | BODY MASS INDEX: 40.39 KG/M2 | TEMPERATURE: 96.6 F | HEART RATE: 83 BPM | HEIGHT: 69 IN | DIASTOLIC BLOOD PRESSURE: 83 MMHG

## 2024-01-31 DIAGNOSIS — Z00.00 MEDICARE ANNUAL WELLNESS VISIT, SUBSEQUENT: ICD-10-CM

## 2024-01-31 DIAGNOSIS — I10 ESSENTIAL HYPERTENSION: ICD-10-CM

## 2024-01-31 DIAGNOSIS — E11.51 DIABETES MELLITUS WITH PERIPHERAL ARTERY DISEASE (MULTI): ICD-10-CM

## 2024-01-31 DIAGNOSIS — I50.30 DIASTOLIC HEART FAILURE, UNSPECIFIED HF CHRONICITY (MULTI): ICD-10-CM

## 2024-01-31 DIAGNOSIS — E55.9 VITAMIN D DEFICIENCY: ICD-10-CM

## 2024-01-31 DIAGNOSIS — E11.51 TYPE 2 DIABETES MELLITUS WITH DIABETIC PERIPHERAL ANGIOPATHY WITHOUT GANGRENE, WITHOUT LONG-TERM CURRENT USE OF INSULIN (MULTI): ICD-10-CM

## 2024-01-31 DIAGNOSIS — C61 PROSTATE CANCER (MULTI): Primary | ICD-10-CM

## 2024-01-31 DIAGNOSIS — I27.20 PULMONARY HYPERTENSION (MULTI): ICD-10-CM

## 2024-01-31 DIAGNOSIS — I10 HYPERTENSION, UNSPECIFIED TYPE: ICD-10-CM

## 2024-01-31 DIAGNOSIS — E78.2 MIXED HYPERLIPIDEMIA: ICD-10-CM

## 2024-01-31 DIAGNOSIS — E66.01 CLASS 2 SEVERE OBESITY DUE TO EXCESS CALORIES WITH SERIOUS COMORBIDITY AND BODY MASS INDEX (BMI) OF 39.0 TO 39.9 IN ADULT (MULTI): ICD-10-CM

## 2024-01-31 DIAGNOSIS — K70.30 ALCOHOLIC CIRRHOSIS OF LIVER WITHOUT ASCITES (MULTI): ICD-10-CM

## 2024-01-31 DIAGNOSIS — Z00.00 WELL ADULT EXAM: ICD-10-CM

## 2024-01-31 DIAGNOSIS — R80.9 ALBUMINURIA: ICD-10-CM

## 2024-01-31 DIAGNOSIS — E08.22 DIABETES MELLITUS DUE TO UNDERLYING CONDITION WITH DIABETIC CHRONIC KIDNEY DISEASE, UNSPECIFIED CKD STAGE, UNSPECIFIED WHETHER LONG TERM INSULIN USE (MULTI): ICD-10-CM

## 2024-01-31 DIAGNOSIS — C61 PROSTATE CANCER (MULTI): ICD-10-CM

## 2024-01-31 DIAGNOSIS — R12 HEARTBURN: ICD-10-CM

## 2024-01-31 DIAGNOSIS — I48.11 LONGSTANDING PERSISTENT ATRIAL FIBRILLATION (MULTI): ICD-10-CM

## 2024-01-31 PROBLEM — D84.81 IMMUNODEFICIENCY DUE TO CONDITIONS CLASSIFIED ELSEWHERE (MULTI): Status: RESOLVED | Noted: 2023-11-08 | Resolved: 2024-01-31

## 2024-01-31 PROBLEM — I65.29 INTERNAL CAROTID ARTERY STENOSIS: Status: RESOLVED | Noted: 2023-09-14 | Resolved: 2024-01-31

## 2024-01-31 PROBLEM — I89.0 LYMPHEDEMA: Status: ACTIVE | Noted: 2024-01-31

## 2024-01-31 PROBLEM — E66.812 CLASS 2 SEVERE OBESITY DUE TO EXCESS CALORIES WITH SERIOUS COMORBIDITY AND BODY MASS INDEX (BMI) OF 39.0 TO 39.9 IN ADULT: Status: ACTIVE | Noted: 2024-01-31

## 2024-01-31 PROBLEM — I77.9 CAROTID ARTERY DISEASE (CMS-HCC): Status: RESOLVED | Noted: 2023-09-14 | Resolved: 2024-01-31

## 2024-01-31 PROCEDURE — 3008F BODY MASS INDEX DOCD: CPT | Performed by: NURSE PRACTITIONER

## 2024-01-31 PROCEDURE — 1036F TOBACCO NON-USER: CPT | Performed by: NURSE PRACTITIONER

## 2024-01-31 PROCEDURE — 36415 COLL VENOUS BLD VENIPUNCTURE: CPT

## 2024-01-31 PROCEDURE — 1126F AMNT PAIN NOTED NONE PRSNT: CPT | Performed by: NURSE PRACTITIONER

## 2024-01-31 PROCEDURE — 1159F MED LIST DOCD IN RCRD: CPT | Performed by: NURSE PRACTITIONER

## 2024-01-31 PROCEDURE — 99214 OFFICE O/P EST MOD 30 MIN: CPT | Performed by: NURSE PRACTITIONER

## 2024-01-31 PROCEDURE — 83036 HEMOGLOBIN GLYCOSYLATED A1C: CPT

## 2024-01-31 PROCEDURE — 3066F NEPHROPATHY DOC TX: CPT | Performed by: NURSE PRACTITIONER

## 2024-01-31 PROCEDURE — 3077F SYST BP >= 140 MM HG: CPT | Performed by: NURSE PRACTITIONER

## 2024-01-31 PROCEDURE — 4010F ACE/ARB THERAPY RXD/TAKEN: CPT | Performed by: NURSE PRACTITIONER

## 2024-01-31 PROCEDURE — 3079F DIAST BP 80-89 MM HG: CPT | Performed by: NURSE PRACTITIONER

## 2024-01-31 PROCEDURE — 84153 ASSAY OF PSA TOTAL: CPT

## 2024-01-31 RX ORDER — AMLODIPINE BESYLATE 10 MG/1
10 TABLET ORAL DAILY
Qty: 90 TABLET | Refills: 3 | Status: SHIPPED | OUTPATIENT
Start: 2024-01-31 | End: 2025-01-30

## 2024-01-31 RX ORDER — PANTOPRAZOLE SODIUM 40 MG/1
40 TABLET, DELAYED RELEASE ORAL DAILY
Qty: 90 TABLET | Refills: 2 | Status: SHIPPED | OUTPATIENT
Start: 2024-01-31 | End: 2024-05-31 | Stop reason: SDUPTHER

## 2024-01-31 ASSESSMENT — ENCOUNTER SYMPTOMS
CHILLS: 0
COUGH: 0
DYSURIA: 0
SHORTNESS OF BREATH: 0
CONSTIPATION: 0
UNEXPECTED WEIGHT CHANGE: 0
NUMBNESS: 0
WOUND: 0
NERVOUS/ANXIOUS: 0
APPETITE CHANGE: 0
ABDOMINAL PAIN: 0
ABDOMINAL DISTENTION: 0
ACTIVITY CHANGE: 0
EYE ITCHING: 0
VOMITING: 0
EYE PAIN: 0
FREQUENCY: 0
ARTHRALGIAS: 0
FEVER: 0
WHEEZING: 0
PALPITATIONS: 0
HEMATURIA: 0
DIARRHEA: 0

## 2024-01-31 NOTE — ASSESSMENT & PLAN NOTE
Chronic,   -BP elevated   Follows w cards: Dr Garner. Follows w Nephrology: Dr Jackson  -Spironolactone recently discontinued due to hyperkalemia   Cont current medication: Lasix, metoprolol, lisinopril.  - Increase amlodipine to 10 mg daily for better BP control (1/31/24)

## 2024-01-31 NOTE — ASSESSMENT & PLAN NOTE
Chronic, stable on current medication glipizide/metformin  -Seen by Ophtho 1/2024 (per pt) no records received as of yet  -Follows w podiatry- Dr Ger Snell 9/28/23, next visit in 1 week per patient   -A1c: 10/23  5.9  Cont on Acei  Cont on statin   Microalbumin 1/2024  Follows w Nephrology dr Jackson

## 2024-01-31 NOTE — ASSESSMENT & PLAN NOTE
-afib diagnosed 2008  -6/28/2018 Pharm Stress test -normal cardiac nuclear perfusion study except for fixed defect likely due to attenuation artifact.  Normal left ventricular systolic function EF 75%  -now managed by cardiology- Dr Garner.  Warfarin discontinued 1/2024, eliquis started. Tolerating well.

## 2024-01-31 NOTE — ASSESSMENT & PLAN NOTE
Per prior PCP (in [facility]) record review:   10/24/22-  mild concentric lv hypertrophy, mod bi-atrial dilation, mil-mod mitral regurg, trivial  tricus regurg.   Pulmonary hypertensionWith PA pressures about 60 mmHg.   EF 53%.  10/28/22-  pet/ct Myocardial perfusion report- normal study  Follows w cards: Dr Garner.   Cont current medication: Lasix, metoprolol, lisinopril. Increase amlodipine to 10 mg daily for better BP control (1/31/24)

## 2024-01-31 NOTE — PROGRESS NOTES
Chief Complaint  Hypertension and Diabetes.    History Of Present Illness  Ricardo Chew is a 69 y.o. male presents today for follow up of Hypertension and Diabetes.    Since last visit patient was evaluated by cardiology- Dr Garner.  For atrial fibrillation his anticoagulation was changed from warfarin to Eliquis. Tolerating medication well.   No bruising/bleeding.     Patient also established care with nephrology Dr. Jackson.  Due to hyperkalemia spironolactone was discontinued.  Follow-up labs did show an normal potassium level.  Due to elevated blood pressure amlodipine was started.  Due to elevated blood pressure amlodipine was started in November 2023      Hemoglobin A1c 10/16/2023 5.9.  Will see poditrist in q week   Seen by Piedad this week     Review of Systems  Review of Systems   Constitutional:  Negative for activity change (ambulates w walker), appetite change, chills, fever and unexpected weight change.   HENT:  Negative for congestion.    Eyes:  Negative for pain and itching.   Respiratory:  Negative for cough, shortness of breath and wheezing.    Cardiovascular:  Negative for chest pain, palpitations and leg swelling.   Gastrointestinal:  Negative for abdominal distention, abdominal pain, constipation, diarrhea and vomiting.   Genitourinary:  Negative for dysuria, frequency, hematuria and urgency.        Nocturia once or twice- chronic   Musculoskeletal:  Negative for arthralgias and gait problem.   Skin:  Negative for rash and wound.   Neurological:  Negative for numbness.   Psychiatric/Behavioral:  The patient is not nervous/anxious.        Past Medical History  He has a past medical history of Foot ulcer (CMS/HCC).    Surgical History  He has a past surgical history that includes Cataract extraction.    Family History  Family History   Problem Relation Name Age of Onset    Hypertension Father      Heart failure Father      Parkinsonism Father      Other (Cardiac Disorder) Brother      Other  "(Cardiac Pacemaker) Brother      Parkinsonism Brother          Social History  He reports that he quit smoking about 51 years ago. His smoking use included cigarettes. He smoked an average of 1 pack per day. He has never been exposed to tobacco smoke. He has never used smokeless tobacco. He reports current alcohol use. He reports that he does not use drugs.    Allergies  Ketorolac    Medications  Current Outpatient Medications   Medication Instructions    allopurinol (ZYLOPRIM) 100 mg, oral, Daily    amLODIPine (NORVASC) 10 mg, oral, Daily    apixaban (ELIQUIS) 5 mg, oral, 2 times daily    atorvastatin (Lipitor) 40 mg tablet 1 tablet, oral, Daily, TAKE 1 TABLET Before meals    cholecalciferol (Vitamin D-3) 50 MCG (2000 UT) tablet 1 tablet, oral, Daily    colchicine, gout, 0.6 mg tablet 1 tablet, oral, Daily, Take 1 tablet daily    dapagliflozin propanediol (FARXIGA) 10 mg, oral, Daily    folic acid (FOLVITE) 0.8 mg, oral, Daily    furosemide (Lasix) 20 mg tablet 1 tablet, oral, Daily, Take 1 tablet daily    glipizide-metformin (Metaglip) 5-500 mg tablet 1 tablet, oral, 2 times daily    lisinopril 40 mg tablet 1 tablet, oral, Daily, Take 1 tablet daily    metoprolol succinate XL (Toprol-XL) 200 mg 24 hr tablet 1 tablet, oral, Daily, Do not crush or chew. - Take 1 tablet daily    multivit-minerals/folic acid (CENTRUM ADULTS ORAL) oral    multivitamin tablet 1 tablet, oral, Daily    pantoprazole (PROTONIX) 40 mg, oral, Daily        Objective   /83   Pulse 83   Temp 35.9 °C (96.6 °F)   Ht 1.753 m (5' 9\")   Wt 124 kg (272 lb 11.2 oz)   SpO2 97%   BMI 40.27 kg/m²    BMI: Estimated body mass index is 40.27 kg/m² as calculated from the following:    Height as of this encounter: 1.753 m (5' 9\").    Weight as of this encounter: 124 kg (272 lb 11.2 oz).    Physical Exam  Physical Exam  Vitals and nursing note reviewed.   Constitutional:       Appearance: Normal appearance. He is obese.   HENT:      Head: " Normocephalic and atraumatic.      Nose: Nose normal.      Mouth/Throat:      Mouth: Mucous membranes are moist.      Pharynx: Oropharynx is clear.   Eyes:      Extraocular Movements: Extraocular movements intact.      Conjunctiva/sclera: Conjunctivae normal.      Pupils: Pupils are equal, round, and reactive to light.   Cardiovascular:      Rate and Rhythm: Normal rate. Rhythm irregular.      Pulses: Normal pulses.      Heart sounds: Normal heart sounds.   Pulmonary:      Effort: Pulmonary effort is normal.      Breath sounds: Normal breath sounds.   Abdominal:      General: Bowel sounds are normal.      Palpations: Abdomen is soft.      Tenderness: There is no abdominal tenderness.   Musculoskeletal:         General: Normal range of motion.      Right elbow: Deformity present.      Left elbow: Deformity present.      Cervical back: Neck supple.      Comments:  +1B LE edema, B shins w dark discolorationn (venous stasis), B LE w vericose veins. No open areas. R heel callus- intact. no open lesion on B feet, Gouty bursitis B olecranon    Skin:     General: Skin is warm and dry.   Neurological:      General: No focal deficit present.      Mental Status: He is alert and oriented to person, place, and time. Mental status is at baseline.   Psychiatric:         Mood and Affect: Mood normal. Affect is flat.         Behavior: Behavior normal.         Thought Content: Thought content normal.         Judgment: Judgment normal.         Relevant Results and Imaging  Orders Only on 01/10/2024   Component Date Value Ref Range Status    [facility] BUN/Creat Ratio 01/10/2024 21.7   Final    [facility] CO2, venous 01/10/2024 24.0  20.0 - 31.0 mmol/L Final    [facility] Creatinine 01/10/2024 1.2 (H)  0.6 - 1.1 mg/dL Final    [facility] K 01/10/2024 4.4  3.5 - 5.1 mmol/L Final    [facility] BUN 01/10/2024 26 (H)  9 - 23 mg/dL Final    Comment: -  Venipuncture should occur prior to N-Acetyl Cysteine (NAC) or Metamizole (Sulpyrine)  administration due to the potential for falsely depressed results.  -  Blood samples from some patients with monoclonal gammopathies may produce falsely elevated results      [facility] Calculated Osmolality 01/10/2024 277  275 - 295 mOsm/kg Final    [facility] Calcium 01/10/2024 9.7  8.7 - 10.4 mg/dL Final    [facility] Chloride 01/10/2024 100  98 - 107 mmol/L Final    [facility] GFR AA 01/10/2024 >60   Final    Comment:  GFR CalcMedical judgement is necessary to interpret GFR.  The calculated GFR may not accurately reflect renal status in patients >70 years, pregnant women, acutely ill hospitalized patients and patients with acute renal failure or known renal disease.  The MDRD GFR formula is valid only for adults greater than 18 years of age.  Note:  Creatinine clearance (not GFR) should be used for drug dosing.      [facility] Na 01/10/2024 133 (L)  135 - 145 mmol/L Final    [facility] Glucose 01/10/2024 196 (H)  74 - 106 mg/dL Final    [facility] Glomerular Filtration R* 01/10/2024 >60  mL/min/1.73m? Final    Comment: Non- GFR CalcMedical judgement is necessary to interpret GFR.  The calculated GFR may not accurately reflect renal status in patients >70 years, pregnant women, acutely ill hospitalized patients and patients with acute renal failure or known renal disease.  The MDRD GFR formula is valid only for adults greater than 18 years of age.  Note:  Creatinine clearance (not GFR) should be used for drug dosing.     Orders Only on 01/03/2024   Component Date Value Ref Range Status    [facility] Creatinine Random, U 01/03/2024 15.0  mg/dL Final    [facility] Total Protein, Random U* 01/03/2024 <6  1 - 14 mg/dL Final    [facility] Na 01/03/2024 128 (L)  135 - 145 mmol/L Final    [facility] BUN/Creat Ratio 01/03/2024 25.4   Final    [facility] Creatinine 01/03/2024 1.3 (H)  0.6 - 1.1 mg/dL Final    [facility] GFR AA 01/03/2024 >60   Final    Comment:  GFR  CalcMedical judgement is necessary to interpret GFR.  The calculated GFR may not accurately reflect renal status in patients >70 years, pregnant women, acutely ill hospitalized patients and patients with acute renal failure or known renal disease.  The MDRD GFR formula is valid only for adults greater than 18 years of age.  Note:  Creatinine clearance (not GFR) should be used for drug dosing.      [facility] CO2, venous 01/03/2024 26.0  20.0 - 31.0 mmol/L Final    [facility] K 01/03/2024 5.8 (H)  3.5 - 5.1 mmol/L Final    Specimen slightly hemolyzed. Results may be affected.    [facility] Calculated Osmolality 01/03/2024 271 (L)  275 - 295 mOsm/kg Final    [facility] Calcium 01/03/2024 10.0  8.7 - 10.4 mg/dL Final    [facility] BUN 01/03/2024 33 (H)  9 - 23 mg/dL Final    Comment: -  Venipuncture should occur prior to N-Acetyl Cysteine (NAC) or Metamizole (Sulpyrine) administration due to the potential for falsely depressed results.  -  Blood samples from some patients with monoclonal gammopathies may produce falsely elevated results      [facility] Glucose 01/03/2024 217 (H)  74 - 106 mg/dL Final    [facility] Chloride 01/03/2024 97 (L)  98 - 107 mmol/L Final    [facility] Glomerular Filtration R* 01/03/2024 55  mL/min/1.73m? Final    Comment: Non- GFR CalcMedical judgement is necessary to interpret GFR.  The calculated GFR may not accurately reflect renal status in patients >70 years, pregnant women, acutely ill hospitalized patients and patients with acute renal failure or known renal disease.  The MDRD GFR formula is valid only for adults greater than 18 years of age.  Note:  Creatinine clearance (not GFR) should be used for drug dosing.      [facility] Vit D 25 01/03/2024 29  ng/mL Final    Comment: Less than 20 ng/mL  Deficient  20 ? 30 ng/mL   Insufficient  30 ? 100 ng/mL   Sufficiency  Greater than 100 ng/mL Potential Toxicity      [facility] I-PTH 01/03/2024 43.0  18.4 - 80.1 pg/mL Final     Comment: - Interpretation of intact PTH values should take into account serum calcium results and the interrelationship between these two elements in various disorders involving PTH and calcium  - Measurement of intact PTH is useful in differentiating between hypercalcemia due to       hyperparathyroidism and hypercalcemia of malignancy  - The assay is not intended as, and should not be relied upon as, a diagnostic indicator of malignancy  - In patients with abnormal renal function, interpret the PTH result with caution, and do not make patient management decisions on the PTH result alone      [facility] Microalbumin, Urine mg/L 01/03/2024 24.0  mg/L Final    [facility] Creatinine, Urine mg/dl 01/03/2024 15.2  mg/dL Final    [facility] Microalbumin/Creatinine* 01/03/2024 158 (H)  0 - 30 mg MALB/gm CREAT Final   Clinical Support on 12/29/2023   Component Date Value Ref Range Status    POC INR 12/29/2023 1.40   Final   Clinical Support on 12/01/2023   Component Date Value Ref Range Status    POC INR 12/01/2023 2.10   Final   Clinical Support on 11/14/2023   Component Date Value Ref Range Status    POC INR 11/14/2023 1.50   Final   Hospital Outpatient Visit on 11/07/2023   Component Date Value Ref Range Status    AV mn grad 11/07/2023 11.0   Final    AV pk clemente 11/07/2023 2.28   Final    LVOT diam 11/07/2023 2.00   Final    MV avg E/e' ratio 11/07/2023 11.50   Final    RVSP 11/07/2023 57.2   Final    LVIDd 11/07/2023 4.80   Final    Aortic Valve Area by Continuity of* 11/07/2023 1.49   Final    AV pk grad 11/07/2023 20.8   Final    Aortic Valve Area by Continuity of* 11/07/2023 1.48   Final    LV A4C EF 11/07/2023 58.8   Final   Ancillary Procedure on 11/07/2023   Component Date Value Ref Range Status    BSA 11/07/2023 2.42  m2 Final   Clinical Support on 10/31/2023   Component Date Value Ref Range Status    POC INR 10/31/2023 1.80   Final   Office Visit on 10/16/2023   Component Date Value Ref Range Status    Albumin,  Urine Random 10/16/2023 342.0  Not established mg/L Final    Creatinine, Urine Random 10/16/2023 45.8  20.0 - 370.0 mg/dL Final    Albumin/Creatine Ratio 10/16/2023 746.7 (H)  <30.0 ug/mg Creat Final    POC INR 10/16/2023 4.20   Final     No images are attached to the encounter.        Assessment and Plan  Assessment/Plan   Problem List Items Addressed This Visit             ICD-10-CM    Alcoholic cirrhosis (CMS/HCC) K70.30     Follows w gi Dr Mateo Nicole  -US Abd ordered          A-fib (CMS/HCC) I48.91     -afib diagnosed 2008  -6/28/2018 Pharm Stress test -normal cardiac nuclear perfusion study except for fixed defect likely due to attenuation artifact.  Normal left ventricular systolic function EF 75%  -now managed by cardiology- Dr Garner.  Warfarin discontinued 1/2024, eliquis started. Tolerating well.          Relevant Medications    amLODIPine (Norvasc) 10 mg tablet    Type 2 diabetes mellitus with diabetic peripheral angiopathy without gangrene, without long-term current use of insulin (CMS/[facility]) E11.51     Chronic, stable on current medication glipizide/metformin  -Seen by Ophtho 1/2024 (per pt) no records received as of yet  -Follows w podiatry- Dr Ger Snell 9/28/23, next visit in 1 week per patient   -A1c: 10/23  5.9  Cont on Acei  Cont on statin   Microalbumin 1/2024  Follows w Nephrology dr Jackson         Relevant Medications    amLODIPine (Norvasc) 10 mg tablet    Diastolic heart failure (CMS/[facility]) I50.30     Per prior PCP (in [place]) record review:   10/24/22-  mild concentric lv hypertrophy, mod bi-atrial dilation, mil-mod mitral regurg, trivial  tricus regurg.   Pulmonary hypertensionWith PA pressures about 60 mmHg.   EF 53%.  10/28/22-  pet/ct Myocardial perfusion report- normal study  Follows w cards: Dr Garner.   Cont current medication: Lasix, metoprolol, lisinopril. Increase amlodipine to 10 mg daily for better BP control (1/31/24)         Relevant Medications    amLODIPine (Norvasc)  10 mg tablet    HLD (hyperlipidemia) E78.5     Chronic, stable  -Well-controlled on atorvastatin  -lipid panel due 7/2024         Hypertension I10     Chronic,   -BP elevated   Follows w cards: Dr Garner. Follows w Nephrology: Dr Jackson  -Spironolactone recently discontinued due to hyperkalemia   Cont current medication: Lasix, metoprolol, lisinopril.  - Increase amlodipine to 10 mg daily for better BP control (1/31/24)         Pulmonary hypertension (CMS/[facility]) I27.20    Vitamin D deficiency E55.9    Relevant Medications    amLODIPine (Norvasc) 10 mg tablet    Class 2 severe obesity due to excess calories with serious comorbidity and body mass index (BMI) of 39.0 to 39.9 in adult (CMS/[facility]) E66.01, Z68.39     Physical activity limited   Reviewed recommendations for healthier lifestyle choices           Other Visit Diagnoses         Codes    Prostate cancer (CMS/[facility])    -  Primary C61    Relevant Orders    Prostate Specific Antigen, Screen    Albuminuria     R80.9    Relevant Medications    amLODIPine (Norvasc) 10 mg tablet    Diabetes mellitus with peripheral artery disease (CMS/[facility])     E11.51    Relevant Medications    amLODIPine (Norvasc) 10 mg tablet    Other Relevant Orders    Hemoglobin A1C    Diabetes mellitus due to underlying condition with diabetic chronic kidney disease, unspecified CKD stage, unspecified whether long term insulin use (CMS/[facility])     E08.22    Relevant Medications    amLODIPine (Norvasc) 10 mg tablet    Essential hypertension     I10    Relevant Medications    amLODIPine (Norvasc) 10 mg tablet    Well adult exam     Z00.00    Medicare annual wellness visit, subsequent     Z00.00    Relevant Orders    Follow Up In Primary Care - Medicare Annual

## 2024-02-01 LAB
EST. AVERAGE GLUCOSE BLD GHB EST-MCNC: 120 MG/DL
HBA1C MFR BLD: 5.8 %
PSA SERPL-MCNC: 0.6 NG/ML

## 2024-02-13 ENCOUNTER — TELEPHONE (OUTPATIENT)
Dept: PRIMARY CARE | Facility: CLINIC | Age: 69
End: 2024-02-13
Payer: MEDICARE

## 2024-02-13 DIAGNOSIS — I48.11 LONGSTANDING PERSISTENT ATRIAL FIBRILLATION (MULTI): Primary | ICD-10-CM

## 2024-02-13 RX ORDER — METOPROLOL SUCCINATE 200 MG/1
200 TABLET, EXTENDED RELEASE ORAL DAILY
Qty: 90 TABLET | Refills: 3 | Status: SHIPPED | OUTPATIENT
Start: 2024-02-13 | End: 2025-02-12

## 2024-02-19 ENCOUNTER — OFFICE VISIT (OUTPATIENT)
Dept: PULMONOLOGY | Facility: CLINIC | Age: 69
End: 2024-02-19
Payer: MEDICARE

## 2024-02-19 VITALS
OXYGEN SATURATION: 97 % | WEIGHT: 278 LBS | HEIGHT: 70 IN | SYSTOLIC BLOOD PRESSURE: 157 MMHG | DIASTOLIC BLOOD PRESSURE: 80 MMHG | HEART RATE: 75 BPM | BODY MASS INDEX: 39.8 KG/M2 | TEMPERATURE: 97.7 F

## 2024-02-19 DIAGNOSIS — I27.20 PULMONARY HYPERTENSION (MULTI): ICD-10-CM

## 2024-02-19 DIAGNOSIS — E66.01 CLASS 2 SEVERE OBESITY WITH SERIOUS COMORBIDITY AND BODY MASS INDEX (BMI) OF 39.0 TO 39.9 IN ADULT, UNSPECIFIED OBESITY TYPE (MULTI): ICD-10-CM

## 2024-02-19 DIAGNOSIS — G47.33 OSA ON CPAP: Primary | ICD-10-CM

## 2024-02-19 PROCEDURE — 1036F TOBACCO NON-USER: CPT | Performed by: INTERNAL MEDICINE

## 2024-02-19 PROCEDURE — 1126F AMNT PAIN NOTED NONE PRSNT: CPT | Performed by: INTERNAL MEDICINE

## 2024-02-19 PROCEDURE — 1159F MED LIST DOCD IN RCRD: CPT | Performed by: INTERNAL MEDICINE

## 2024-02-19 PROCEDURE — 4010F ACE/ARB THERAPY RXD/TAKEN: CPT | Performed by: INTERNAL MEDICINE

## 2024-02-19 PROCEDURE — 3079F DIAST BP 80-89 MM HG: CPT | Performed by: INTERNAL MEDICINE

## 2024-02-19 PROCEDURE — 3008F BODY MASS INDEX DOCD: CPT | Performed by: INTERNAL MEDICINE

## 2024-02-19 PROCEDURE — 3077F SYST BP >= 140 MM HG: CPT | Performed by: INTERNAL MEDICINE

## 2024-02-19 PROCEDURE — 3044F HG A1C LEVEL LT 7.0%: CPT | Performed by: INTERNAL MEDICINE

## 2024-02-19 PROCEDURE — 99215 OFFICE O/P EST HI 40 MIN: CPT | Performed by: INTERNAL MEDICINE

## 2024-02-19 ASSESSMENT — PATIENT HEALTH QUESTIONNAIRE - PHQ9
2. FEELING DOWN, DEPRESSED OR HOPELESS: NOT AT ALL
SUM OF ALL RESPONSES TO PHQ9 QUESTIONS 1 AND 2: 0
1. LITTLE INTEREST OR PLEASURE IN DOING THINGS: NOT AT ALL

## 2024-02-19 ASSESSMENT — PAIN SCALES - GENERAL: PAINLEVEL: 0-NO PAIN

## 2024-02-20 NOTE — PROGRESS NOTES
Department of Medicine  Division of Pulmonary, Critical Care, and Sleep Medicine  Consultation  [facility] - [address], [address]    I was asked by Patricia Bingham APRN-C*, to evaluate Mr. Ricardo Chew for possible pulmonary hypertension. I have independently interviewed and examined the patient in the office and reviewed available records.    Physician HPI (2/20/2024):  Mr. Chew is a 69-year-old man with past medical history of hypertension, diabetes mellitus type 2, hyperlipidemia, obesity, obstructive sleep apnea on CPAP, chronic atrial fibrillation and history of alcoholic cirrhosis who presented to the office today for evaluation of possible pulmonary hypertension based on most recent echocardiogram.    Echocardiogram done in November 2023 revealed:  LV hypertrophy with EF 60-65%  Mild to moderate tricuspid regurgitation  RV normal in size and function  eRVSP 57 mmHg    The patient reports chronic dyspnea on exertion that has not changed in the past 6 months.  No acute respiratory symptoms today such as persistent cough, wheezing or exertional chest pain.  He reports he has been compliant with CPAP every night.  His most recent sleep study was done more than 10 years ago.  He recently moved to [place] from [place] and thus previous medical records were not available for me to review today.  He is a lifetime non-smoker with no past history of pulmonary disease or recurrent sinopulmonary infections.  Has chronic lower extremity edema (about 1+).  Denies alcohol abuse now.      Immunizations:  Immunization History   Administered Date(s) Administered    Influenza, seasonal, injectable 10/01/2022    Moderna COVID-19 vaccine, Fall 2023, 12 yeasrs and older (50mcg/0.5mL) 10/11/2023    Pfizer Purple Cap SARS-CoV-2 04/02/2021, 04/30/2021, 12/16/2021, 04/12/2022, 10/26/2022    Pneumococcal conjugate vaccine, 20-valent (PREVNAR 20) 08/24/2022    Pneumococcal polysaccharide vaccine, 23-valent, age 2 years and older  (PNEUMOVAX 23) 2011    Tdap vaccine, age 7 year and older (BOOSTRIX, ADACEL) 2016    Zoster vaccine, recombinant, adult (SHINGRIX) 10/30/2018, 2021, 2021, 03/15/2021, 03/15/2021    Zoster, live 2015, 10/29/2018, 10/30/2018, 2021, 03/15/2021       Past Medical History:  Past Medical History:   Diagnosis Date    Foot ulcer (CMS/HCC)     right       Past Surgical History:  Past Surgical History:   Procedure Laterality Date    CATARACT EXTRACTION         Family History:  Family History   Problem Relation Name Age of Onset    Hypertension Father      Heart failure Father      Parkinsonism Father      Other (Cardiac Disorder) Brother      Other (Cardiac Pacemaker) Brother      Parkinsonism Brother         Social History:  Social History     Tobacco Use    Smoking status: Former     Packs/day: 1     Types: Cigarettes     Quit date:      Years since quittin.1     Passive exposure: Never    Smokeless tobacco: Never   Substance Use Topics    Alcohol use: Yes     Comment: weekdays: 3 days per week 4-5 beers,  On weekend 5-6 beers while watching sports    Drug use: Never        Occupational & Environmental History:   Retired      Medications:  Current Outpatient Medications   Medication Instructions    allopurinol (ZYLOPRIM) 100 mg, oral, Daily    amLODIPine (NORVASC) 10 mg, oral, Daily    apixaban (ELIQUIS) 5 mg, oral, 2 times daily    atorvastatin (Lipitor) 40 mg tablet 1 tablet, oral, Daily, TAKE 1 TABLET Before meals    cholecalciferol (Vitamin D-3) 50 MCG (2000 UT) tablet 1 tablet, oral, Daily    colchicine, gout, 0.6 mg tablet 1 tablet, oral, Daily, Take 1 tablet daily    dapagliflozin propanediol (FARXIGA) 10 mg, oral, Daily    folic acid (FOLVITE) 0.8 mg, oral, Daily    furosemide (Lasix) 20 mg tablet 1 tablet, oral, Daily, Take 1 tablet daily    glipizide-metformin (Metaglip) 5-500 mg tablet 1 tablet, oral, 2 times daily    lisinopril 40 mg tablet 1  "tablet, oral, Daily, Take 1 tablet daily    metoprolol succinate XL (TOPROL-XL) 200 mg, oral, Daily, Do not crush or chew. - Take 1 tablet daily    multivit-minerals/folic acid (CENTRUM ADULTS ORAL) oral    multivitamin tablet 1 tablet, oral, Daily    pantoprazole (PROTONIX) 40 mg, oral, Daily        Drug Allergies/Intolerances:  Allergies   Allergen Reactions    Ketorolac Other     Uncontrolled bloody nose        Visit Vitals  /80   Pulse 75   Temp 36.5 °C (97.7 °F) (Temporal)   Ht 1.778 m (5' 10\")   Wt 126 kg (278 lb)   SpO2 97%   BMI 39.89 kg/m²   Smoking Status Former   BSA 2.49 m²      Physical Exam  Vitals and nursing note reviewed.   Constitutional:       General: No in acute distress.     Appearance: Obese  HENT:      Head: Normocephalic and atraumatic.   Eyes:      Conjunctiva/sclera: Conjunctivae normal.   Cardiovascular:      Rate and Rhythm: Normal rate     Lower extremities edema 1+ (symmetric)  Pulmonary:      Effort: Pulmonary effort is normal. No respiratory distress.      Breath sounds: Normal breath sounds. No stridor. No wheezing or rhonchi.   Skin:     General: Skin is warm and dry.      Coloration: Skin is not jaundiced.   Neurological:      General: No focal deficit present.      Mental Status: Alert and oriented to person, place, and time. Mental status is at baseline.   Psychiatric:         Mood and Affect: Mood normal.         Behavior: Behavior normal.         Judgment: Judgment normal.     Pulmonary Function Test Results     No results found.     Chest Radiograph     No results found for this or any previous visit from the past 2000 days.      Echocardiogram     Transthoracic Echo (TTE) Complete 11/07/2023    [facility]  [address] , [place] [address]    TRANSTHORACIC ECHOCARDIOGRAM REPORT      Patient Name:      LOGAN Orozco Physician:    [id number] Kirt Garner MD  Study Date:        11/7/2023            Ordering Provider:    " [id number] DAMIANFREDDY BETANCOURTMIRELA  MRN/PID:           [id number]             Fellow:  Accession#:        [id number]         Nurse:  Date of Birth/Age: 1955 / 68 years Sonographer:          Kristi Torres RDMS,  RCS, RVS  Gender:            M                    Additional Staff:  Height:            175.26 cm            Admit Date:  Weight:            121.11 kg            Admission Status:  BSA:               2.34 m2              Department Location:  [facility]  Blood Pressure: 138 /82 mmHg    Study Type:    TRANSTHORACIC ECHO (TTE) COMPLETE  Diagnosis/ICD: Longstanding persistent AFib-I48.11; Nonrheumatic mitral (valve)  insufficiency-I34.0  Indication:    Persistent atrial Fib., Mitral regurg., DM, HTN, HLD, Murmur  CPT Codes:     Echo Complete w Full Doppler-[id number]  Study Detail: The following Echo studies were performed: 2D, M-Mode, Doppler and  color flow.      PHYSICIAN INTERPRETATION:  Left Ventricle: Left ventricular systolic function is normal. The left ventricular cavity size is normal. There is moderate concentric left ventricular hypertrophy. Left ventricular diastolic filling was indeterminate. LVEF 60+/-5%.  Left Atrium: The left atrium is upper limits of normal in size.  Right Ventricle: The right ventricle is normal in size. There is normal right ventricular global systolic function.  Right Atrium: The right atrium is normal in size.  Aortic Valve: The aortic valve appears abnormal. There is moderate aortic valve cusp calcification. There is no evidence of aortic valve stenosis.  There is no evidence of aortic valve regurgitation. The peak instantaneous gradient of the aortic valve is 20.8 mmHg. The mean gradient of the aortic valve is 11.0 mmHg.  Mitral Valve: The mitral valve is normal in structure. There is moderate mitral annular calcification. There is mild mitral valve regurgitation.  Tricuspid Valve: The tricuspid valve is structurally normal. There is mild to moderate tricuspid  regurgitation.  Pulmonic Valve: The pulmonic valve is not well visualized. The pulmonic valve regurgitation was not well visualized.  Pericardium: There is no pericardial effusion noted.  Aorta: The aortic root is normal.      CONCLUSIONS:  1. Left ventricular systolic function is normal.  2. LVEF 60+/-5%.  3. There is moderate concentric left ventricular hypertrophy.  4. There is moderate mitral annular calcification.  5. Mild to moderate tricuspid regurgitation.  6. Aortic valve appears abnormal.  7. Aortic valve stenosis is not present.  8. There is moderate aortic valve cusp calcification.    QUANTITATIVE DATA SUMMARY:  2D MEASUREMENTS:  Normal Ranges:  Ao Root d:     3.30 cm    (2.0-3.7cm)  LAs:           4.30 cm    (2.7-4.0cm)  RVIDd:         2.71 cm    (0.9-3.6cm)  IVSd:          1.45 cm    (0.6-1.1cm)  LVPWd:         1.32 cm    (0.6-1.1cm)  LVIDd:         4.80 cm    (3.9-5.9cm)  LVIDs:         3.07 cm  LV Mass Index: 115.4 g/m2  LV % FS        36.0 %    AORTA MEASUREMENTS:  Normal Ranges:  Asc Ao, d: 3.10 cm (2.1-3.4cm)    LV SYSTOLIC FUNCTION BY 2D PLANIMETRY (MOD):  Normal Ranges:  EF-A4C View: 58.8 % (>=55%)    LV DIASTOLIC FUNCTION:  Normal Ranges:  MV Peak E:    1.50 m/s (0.7-1.2 m/s)  MV lateral e' 0.13 m/s  MV medial e'  0.08 m/s  E/e' Ratio:   11.50    (<8.0)    MITRAL INSUFFICIENCY:  Normal Ranges:  PISA Radius:  0.9 cm  MR VTI:       199.00 cm  MR Vmax:      516.50 cm/s  MR Alias Ozzie: 42.1 cm/s  MR Volume:    82.55 ml  MR Flow Rt:   214.26 ml/s  MR EROA:      0.41 cm2    AORTIC VALVE:  Normal Ranges:  AoV Vmax:                2.28 m/s  (<=1.7m/s)  AoV Peak P.8 mmHg (<20mmHg)  AoV Mean P.0 mmHg (1.7-11.5mmHg)  LVOT Max Ozzie:            1.08 m/s  (<=1.1m/s)  AoV VTI:                 49.50 cm  (18-25cm)  LVOT VTI:                23.30 cm  LVOT Diameter:           2.00 cm   (1.8-2.4cm)  AoV Area, VTI:           1.48 cm2  (2.5-5.5cm2)  AoV Area,Vmax:           1.49 cm2   "(2.5-4.5cm2)  AoV Dimensionless Index: 0.47    TRICUSPID VALVE/RVSP:  Normal Ranges:  Peak TR Velocity: 3.68 m/s  Est. RA Pressure: 3 mmHg  RV Syst Pressure: 57.2 mmHg (< 30mmHg)    PULMONIC VALVE:  Normal Ranges:  PV Max Ozzie: 0.6 m/s  (0.6-0.9m/s)  PV Max P.5 mmHg      [id number] Kirt Garner MD  Electronically signed on 2023 at 10:25:35 AM        ** Final **        Chest CT Scan     No results found for this or any previous visit from the past 365 days.       Laboratory Studies     No results found for: \"WBC\", \"HGB\", \"HCT\", \"MCV\", \"PLT\"   No results found for: \"GLUCOSE\", \"CALCIUM\", \"NA\", \"K\", \"CO2\", \"CL\", \"BUN\", \"CREATININE\"   No results found for: \"ALT\", \"AST\", \"GGT\", \"ALKPHOS\", \"BILITOT\"     POC INR   Date/Time Value Ref Range Status   2023 12:00 AM 1.40  Final       No results found for: \"ICIGE\", \"IGE\", \"ICA04\", \"ASPFU\", \"IGG\", \"IGA\", \"IGM\"      Assessment and Plan / Recommendations     Pulmonary hypertension:  This is based on most recent echocardiogram that revealed eRVSP of 57 mmHg.  RV is noted to be normal in size and function which is reassuring.  Resting room air O2 saturation is within normal range.  The patient has chronic dyspnea on exertion that is likely multifactorial in the obesity, deconditioning and chronic A-fib.  Lungs were clear on auscultation today.  No known history of chronic parenchymal lung disease.  We discussed today that pulmonary hypertension could be seen in the setting of SHANTAL.     Plan for now:  -- Repeat PSG with titration study  -- PFTs  -- CXR PA/Lateral - might need a CT scan of chest  -- Repeat Echocardiogram prior to next visit (6 months from initial study).     2.   Obstructive sleep apnea:  -- To repeat PSG w/ titration.   -- Continue on CPAP for now until repeated study is done.     3.   Obesity    4.  Other co-morbidities: HTN, DM, Afib.     Please excuse any misspellings or unintended errors related to the Dragon speech recognition software used to " dictate this note.      Jhon Desouza MD  02/19/2024

## 2024-03-25 PROBLEM — F10.20 ALCOHOLISM (MULTI): Status: ACTIVE | Noted: 2019-08-30

## 2024-03-25 PROBLEM — I99.8 VASCULAR INSUFFICIENCY: Status: ACTIVE | Noted: 2023-09-14

## 2024-03-25 PROBLEM — G47.33 OBSTRUCTIVE SLEEP APNEA SYNDROME: Status: ACTIVE | Noted: 2019-08-30

## 2024-03-25 PROBLEM — R26.81 UNSTEADY GAIT: Status: ACTIVE | Noted: 2021-11-04

## 2024-03-25 PROBLEM — I48.20 CHRONIC ATRIAL FIBRILLATION (MULTI): Status: ACTIVE | Noted: 2018-06-18

## 2024-03-25 PROBLEM — E87.5 HYPERKALEMIA: Status: ACTIVE | Noted: 2019-08-30

## 2024-03-25 PROBLEM — C61 MALIGNANT NEOPLASM OF PROSTATE (MULTI): Status: ACTIVE | Noted: 2024-01-31

## 2024-03-25 PROBLEM — K63.5 COLON POLYPS: Status: ACTIVE | Noted: 2019-12-23

## 2024-03-25 PROBLEM — K22.70 BARRETT'S ESOPHAGUS: Status: ACTIVE | Noted: 2019-08-30

## 2024-03-25 PROBLEM — E66.01 SEVERE OBESITY (MULTI): Status: ACTIVE | Noted: 2019-08-30

## 2024-03-25 PROBLEM — I34.0 MITRAL VALVE REGURGITATION: Status: ACTIVE | Noted: 2019-06-17

## 2024-03-25 PROBLEM — E87.1 HYPONATREMIA: Status: ACTIVE | Noted: 2019-08-30

## 2024-03-25 PROBLEM — M1A.9XX1 CHRONIC TOPHACEOUS GOUT: Status: ACTIVE | Noted: 2018-06-18

## 2024-03-25 PROBLEM — R12 HEARTBURN: Status: ACTIVE | Noted: 2024-03-25

## 2024-03-25 PROBLEM — M10.9 GOUTY ARTHROPATHY: Status: ACTIVE | Noted: 2019-08-30

## 2024-03-25 PROBLEM — L97.519 ULCER OF RIGHT FOOT (MULTI): Status: ACTIVE | Noted: 2024-03-25

## 2024-03-25 PROBLEM — N39.0 RECURRENT UTI (URINARY TRACT INFECTION): Status: ACTIVE | Noted: 2023-12-20

## 2024-03-25 PROBLEM — D75.89 MACROCYTOSIS: Status: ACTIVE | Noted: 2018-06-18

## 2024-03-25 PROBLEM — E11.9 DIABETES MELLITUS (MULTI): Status: ACTIVE | Noted: 2023-07-28

## 2024-03-25 PROBLEM — R80.9 ALBUMINURIA: Status: ACTIVE | Noted: 2024-03-25

## 2024-03-25 PROBLEM — D53.9 MACROCYTIC ANEMIA: Status: ACTIVE | Noted: 2019-08-30

## 2024-03-26 ENCOUNTER — OFFICE VISIT (OUTPATIENT)
Dept: CARDIOLOGY | Facility: CLINIC | Age: 69
End: 2024-03-26
Payer: MEDICARE

## 2024-03-26 VITALS
TEMPERATURE: 96.8 F | DIASTOLIC BLOOD PRESSURE: 74 MMHG | HEART RATE: 67 BPM | BODY MASS INDEX: 39.51 KG/M2 | HEIGHT: 70 IN | SYSTOLIC BLOOD PRESSURE: 118 MMHG | WEIGHT: 276 LBS

## 2024-03-26 DIAGNOSIS — E78.2 MIXED HYPERLIPIDEMIA: ICD-10-CM

## 2024-03-26 DIAGNOSIS — I10 PRIMARY HYPERTENSION: ICD-10-CM

## 2024-03-26 DIAGNOSIS — I48.20 CHRONIC ATRIAL FIBRILLATION (MULTI): Primary | ICD-10-CM

## 2024-03-26 PROCEDURE — 1036F TOBACCO NON-USER: CPT | Performed by: INTERNAL MEDICINE

## 2024-03-26 PROCEDURE — 3074F SYST BP LT 130 MM HG: CPT | Performed by: INTERNAL MEDICINE

## 2024-03-26 PROCEDURE — 99214 OFFICE O/P EST MOD 30 MIN: CPT | Performed by: INTERNAL MEDICINE

## 2024-03-26 PROCEDURE — 3078F DIAST BP <80 MM HG: CPT | Performed by: INTERNAL MEDICINE

## 2024-03-26 PROCEDURE — 1159F MED LIST DOCD IN RCRD: CPT | Performed by: INTERNAL MEDICINE

## 2024-03-26 PROCEDURE — 1125F AMNT PAIN NOTED PAIN PRSNT: CPT | Performed by: INTERNAL MEDICINE

## 2024-03-26 PROCEDURE — 4010F ACE/ARB THERAPY RXD/TAKEN: CPT | Performed by: INTERNAL MEDICINE

## 2024-03-26 PROCEDURE — 3008F BODY MASS INDEX DOCD: CPT | Performed by: INTERNAL MEDICINE

## 2024-03-26 PROCEDURE — 3044F HG A1C LEVEL LT 7.0%: CPT | Performed by: INTERNAL MEDICINE

## 2024-03-26 ASSESSMENT — PAIN SCALES - GENERAL: PAINLEVEL: 4

## 2024-03-26 NOTE — PROGRESS NOTES
1.  Atrial fibrillation persistent CHADSVASc 4  2.  Hypertension  3.  Diabetes mellitus  4.  Hyperlipidemia  5.  Obesity  6.  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 in the hallways without difficulty he has no complaints orthopnea PND palpitation or syncopal episodes    BMI today 39.6 previously 39.3  Blood pressure 118/74 mmHg and a heart rate of 67 patient is a well-developed well-nourished male in no acute distress speaking full sentences no carotid bruits upstroke and volumes are normal heart irregularly irregular S1 variable S2 is normal no gallop or murmurs, lungs are decreased breath sound but clear abdomen is morbidly obese positive bowel sounds soft and nontender    January 31st 2024  A1c 5.8%  Sodium 133, potassium 4.4, BUN 26, creatinine 1.2  Blood glucose level of 196    Atrial fibrillation permanent rate control anticoagulation, [id number] score of 4 continued anticoagulation was discussed and recommended  Hypertension under good control  Hyperlipidemia patient is provided requisition for CBC CMP and a lipid panel, will notify patient of the test results via the phone  Overweight heart healthy diet and weight loss recommended  Patient is a non-smoker  Return to my clinic in 6 months.

## 2024-03-27 ENCOUNTER — HOSPITAL ENCOUNTER (OUTPATIENT)
Dept: RESPIRATORY THERAPY | Facility: HOSPITAL | Age: 69
Discharge: HOME | End: 2024-03-27
Payer: MEDICARE

## 2024-03-27 ENCOUNTER — HOSPITAL ENCOUNTER (OUTPATIENT)
Dept: RADIOLOGY | Facility: HOSPITAL | Age: 69
Discharge: HOME | End: 2024-03-27
Payer: MEDICARE

## 2024-03-27 DIAGNOSIS — I27.20 PULMONARY HYPERTENSION (MULTI): ICD-10-CM

## 2024-03-27 PROCEDURE — 94726 PLETHYSMOGRAPHY LUNG VOLUMES: CPT | Performed by: INTERNAL MEDICINE

## 2024-03-27 PROCEDURE — 94726 PLETHYSMOGRAPHY LUNG VOLUMES: CPT

## 2024-03-27 PROCEDURE — 94729 DIFFUSING CAPACITY: CPT | Performed by: INTERNAL MEDICINE

## 2024-03-27 PROCEDURE — 71046 X-RAY EXAM CHEST 2 VIEWS: CPT | Performed by: STUDENT IN AN ORGANIZED HEALTH CARE EDUCATION/TRAINING PROGRAM

## 2024-03-27 PROCEDURE — 71046 X-RAY EXAM CHEST 2 VIEWS: CPT

## 2024-03-27 PROCEDURE — 94010 BREATHING CAPACITY TEST: CPT | Performed by: INTERNAL MEDICINE

## 2024-03-30 DIAGNOSIS — I48.20 CHRONIC ATRIAL FIBRILLATION (MULTI): ICD-10-CM

## 2024-04-01 RX ORDER — APIXABAN 5 MG/1
5 TABLET, FILM COATED ORAL 2 TIMES DAILY
Qty: 60 TABLET | Refills: 2 | Status: SHIPPED | OUTPATIENT
Start: 2024-04-01

## 2024-04-04 LAB
MGC ASCENT PFT - FEV1 - PRE: 1.6
MGC ASCENT PFT - FEV1 - PREDICTED: 3.09
MGC ASCENT PFT - FVC - PRE: 2.48
MGC ASCENT PFT - FVC - PREDICTED: 4.08

## 2024-04-10 NOTE — PROGRESS NOTES
Patient: Ricardo Chew    [id number]  : 1955 -- AGE 69 y.o.    Provider: DAJUAN Wagner   Location [facility]   Service Date: 4/15/2024       Department of Medicine  Division of Pulmonary, Critical Care, and Sleep Medicine         [facility]  Follow Up Visit Note        HISTORY OF PRESENT ILLNESS     HISTORY OF PRESENT ILLNESS   Mr. Chew is a 69-year-old man with past medical history of hypertension, diabetes mellitus type 2, hyperlipidemia, obesity, obstructive sleep apnea on CPAP, chronic atrial fibrillation and history of alcoholic cirrhosis who presented to the office today for evaluation of possible pulmonary hypertension based on most recent echocardiogram.    DATE OF LAST VISIT: 2024 by Dr. Baltazar       Current History 4/15/2024    On today's visit, the patient reports no ER visits. Denies cough or wheezing or chest pain or fever or chills.   He walks with a rollator, he was not SKAGGS walking from [employer] lot to office.   He lives in assistive living.     Allergies - denies runny nose or tingling in his throat  GERD denies   Has SHANTAL - using CPAP every night for 8 hrs. Has supplies - possible health care solutions. - needs repeat study reports next available is         Office Visit    2024  [facility]       Expand All Collapse All     Department of Medicine  Division of Pulmonary, Critical Care, and Sleep Medicine  Consultation  [facility] - [address], [address]     I was asked by Patricia Bingham APRN-C*, to evaluate Mr. Ricardo Chew for possible pulmonary hypertension. I have independently interviewed and examined the patient in the office and reviewed available records.     Physician HPI (2024):  Mr. Chew is a 69-year-old man with past medical history of hypertension, diabetes mellitus type 2, hyperlipidemia, obesity, obstructive sleep apnea on CPAP, chronic atrial fibrillation and history of alcoholic  cirrhosis who presented to the office today for evaluation of possible pulmonary hypertension based on most recent echocardiogram.     Echocardiogram done in November 2023 revealed:  LV hypertrophy with EF 60-65%  Mild to moderate tricuspid regurgitation  RV normal in size and function  eRVSP 57 mmHg     The patient reports chronic dyspnea on exertion that has not changed in the past 6 months.  No acute respiratory symptoms today such as persistent cough, wheezing or exertional chest pain.  He reports he has been compliant with CPAP every night.  His most recent sleep study was done more than 10 years ago.  He recently moved to [place] from [place] and thus previous medical records were not available for me to review today.  He is a lifetime non-smoker with no past history of pulmonary disease or recurrent sinopulmonary infections.  Has chronic lower extremity edema (about 1+).  Denies alcohol abuse now.              Electronically signed by Jhon Desouza MD at 2/20/2024  9:14 A           REVIEW OF SYSTEMS     REVIEW OF SYSTEMS  Review of Systems   Constitutional:  Positive for activity change.   Musculoskeletal:  Positive for joint swelling.        Bilateral elbows swollen   All other systems reviewed and are negative.        ALLERGIES AND MEDICATIONS     ALLERGIES  Allergies   Allergen Reactions    Ketorolac Other     Uncontrolled bloody nose       MEDICATIONS  Current Outpatient Medications   Medication Sig Dispense Refill    allopurinol (Zyloprim) 100 mg tablet TAKE 1 TABLET DAILY 90 tablet 1    amLODIPine (Norvasc) 10 mg tablet Take 1 tablet (10 mg) by mouth once daily. 90 tablet 3    atorvastatin (Lipitor) 40 mg tablet Take 1 tablet (40 mg) by mouth once daily. TAKE 1 TABLET Before meals      cholecalciferol (Vitamin D-3) 50 MCG (2000 UT) tablet Take 1 tablet (2,000 Units) by mouth once daily.      colchicine, gout, 0.6 mg tablet Take 1 tablet (0.6 mg) by mouth once daily. Take 1 tablet daily       dapagliflozin propanediol (Farxiga) 10 mg Take 1 tablet (10 mg) by mouth once daily. 90 tablet 3    Eliquis 5 mg tablet TAKE 1 TABLET BY MOUTH TWICE A DAY 60 tablet 2    folic acid (Folvite) 800 mcg tablet Take 1 tablet (0.8 mg) by mouth once daily.      furosemide (Lasix) 20 mg tablet Take 1 tablet (20 mg) by mouth once daily. Take 1 tablet daily      glipizide-metformin (Metaglip) 5-500 mg tablet TAKE 1 TABLET TWICE A  tablet 2    lisinopril 40 mg tablet Take 1 tablet (40 mg) by mouth once daily. Take 1 tablet daily      metoprolol succinate XL (Toprol-XL) 200 mg 24 hr tablet Take 1 tablet (200 mg) by mouth once daily. Do not crush or chew. - Take 1 tablet daily 90 tablet 3    multivit-minerals/folic acid (CENTRUM ADULTS ORAL) Take by mouth.      multivitamin tablet Take 1 tablet by mouth once daily.      pantoprazole (ProtoNix) 40 mg EC tablet TAKE 1 TABLET BY MOUTH EVERY DAY 90 tablet 2     No current facility-administered medications for this visit.         PAST HISTORY     PAST MEDICAL HISTORY  He  has a past medical history of Foot ulcer (CMS/[facility]).       PAST SURGICAL HISTORY  Past Surgical History:   Procedure Laterality Date    CATARACT EXTRACTION         IMMUNIZATION HISTORY  Immunization History   Administered Date(s) Administered    Flu vaccine (IIV4), preservative free *Check age/dose* 12/17/2015, 10/24/2016    Flu vaccine, quadrivalent, high-dose, preservative free, age 65y+ (FLUZONE) 09/20/2023    Influenza, Unspecified 10/06/2011, 10/01/2013, 10/01/2015, 10/25/2016, 11/20/2017, 10/30/2018, 11/05/2019    Influenza, seasonal, injectable 10/01/2022    Moderna COVID-19 vaccine, Fall 2023, 12 yeasrs and older (50mcg/0.5mL) 10/11/2023    Pfizer Purple Cap SARS-CoV-2 04/02/2021, 04/30/2021, 12/16/2021, 04/12/2022, 10/26/2022    Pneumococcal conjugate vaccine, 20-valent (PREVNAR 20) 08/24/2022    Pneumococcal polysaccharide vaccine, 23-valent, age 2 years and older (PNEUMOVAX 23) 04/19/2011    Tdap  "vaccine, age 7 year and older (BOOSTRIX, ADACEL) 2016    Zoster vaccine, recombinant, adult (SHINGRIX) 10/30/2018, 2021, 2021, 03/15/2021, 03/15/2021    Zoster, live 2015, 10/29/2018, 10/30/2018, 2021, 03/15/2021       SOCIAL HISTORY  He  reports that he quit smoking about 51 years ago. His smoking use included cigarettes. He has never been exposed to tobacco smoke. He has never used smokeless tobacco. He reports current alcohol use. He reports that he does not use drugs. He Patient   Social History:  Social History            Tobacco Use    Smoking status: Former       Packs/day: 1       Types: Cigarettes       Quit date:        Years since quittin.1       Passive exposure: Never    Smokeless tobacco: Never   Substance Use Topics    Alcohol use: Yes       Comment: weekdays: 3 days per week 4-5 beers,  On weekend 5-6 beers while watching sports    Drug use: Never         Occupational & Environmental History:   Retired      FAMILY HISTORY  Family History   Problem Relation Name Age of Onset    Hypertension Father      Heart failure Father      Parkinsonism Father      Other (Cardiac Disorder) Brother      Other (Cardiac Pacemaker) Brother      Parkinsonism Brother           PHYSICAL EXAM     VITAL SIGNS: There were no vitals taken for this visit. /65   Pulse 73   Resp 18   Ht 1.778 m (5' 10\")   Wt 127 kg (279 lb 9.6 oz)   SpO2 93%   BMI 40.12 kg/m²      PREVIOUS WEIGHTS:  Wt Readings from Last 3 Encounters:   24 125 kg (276 lb)   24 126 kg (278 lb)   24 124 kg (272 lb 11.2 oz)       Physical Exam  Constitutional:       Appearance: Normal appearance.   HENT:      Head: Normocephalic and atraumatic.      Right Ear: External ear normal.      Left Ear: External ear normal.      Nose: Nose normal.      Mouth/Throat:      Mouth: Mucous membranes are moist.      Pharynx: Oropharynx is clear.   Eyes:      Extraocular Movements: " Extraocular movements intact.      Conjunctiva/sclera: Conjunctivae normal.      Pupils: Pupils are equal, round, and reactive to light.   Cardiovascular:      Rate and Rhythm: Normal rate and regular rhythm.      Pulses: Normal pulses.      Heart sounds: Normal heart sounds.   Pulmonary:      Effort: Pulmonary effort is normal.      Comments: Poor inspiratory effort, diminished bases  Abdominal:      General: Bowel sounds are normal.      Palpations: Abdomen is soft.   Musculoskeletal:         General: Normal range of motion.      Cervical back: Normal range of motion and neck supple.      Right lower leg: Edema present.      Left lower leg: Edema present.      Comments: Bilateral elbows swollen   Skin:     General: Skin is warm and dry.   Neurological:      General: No focal deficit present.      Mental Status: He is alert and oriented to person, place, and time. Mental status is at baseline.   Psychiatric:         Mood and Affect: Mood normal.         Behavior: Behavior normal.         Thought Content: Thought content normal.         Judgment: Judgment normal.           RESULTS/DATA     Pulmonary Function Test Results     3/27/24    Study Result    Narrative & Impression   Spirometry shows no obstruction. Lung volumes indicate a moderate restrictive defect. The DLCO corrected for hemoglobin is moderately reduced. Low DLCO with low/normal KCO is consistent with a loss of alveoli capillary structure with loss of lung volume.       Chest Radiograph     XR chest 2 views 03/27/2024    Narrative  Interpreted By:  Kody Lopez,  STUDY:  XR CHEST 2 VIEWS;  3/27/2024 10:00 am    INDICATION:  Signs/Symptoms:dyspnea.    COMPARISON:  None.    ACCESSION NUMBER(S):  [id number]    ORDERING CLINICIAN:  DARLINE MORRISON    FINDINGS:  PA and lateral radiographs of the chest were provided.      CARDIOMEDIASTINAL SILHOUETTE:  Cardiomediastinal silhouette is normal in size and configuration.    LUNGS:  Small right basilar  pleural effusion and atelectasis. Trace left  basilar pleural effusion. Mild diffuse interstitial prominence. No  pneumothorax. No dense focal consolidation.    ABDOMEN:  No remarkable upper abdominal findings.    BONES:  No acute osseous changes.    Impression  1.  Small right and trace left basilar pleural effusions and  atelectasis.  2. Mild diffuse interstitial prominence. Correlate with volume status  and concern for interstitial edema.        MACRO:  None    Signed by: Kody Lopez 3/29/2024 6:04 PM  Dictation workstation:   [id number]      Chest CT Scan     No testing done      Echocardiogram     Transthoracic Echo (TTE) Complete 11/07/2023     Narrative  [facility]  [address], [place] [address]     TRANSTHORACIC ECHOCARDIOGRAM REPORT        Patient Name:      LOGAN COHEN        Reading Physician:    [id number] Kirt Garner MD  Study Date:        11/7/2023                   PHYSICIAN INTERPRETATION:  Left Ventricle: Left ventricular systolic function is normal. The left ventricular cavity size is normal. There is moderate concentric left ventricular hypertrophy. Left ventricular diastolic filling was indeterminate. LVEF 60+/-5%.  Left Atrium: The left atrium is upper limits of normal in size.  Right Ventricle: The right ventricle is normal in size. There is normal right ventricular global systolic function.  Right Atrium: The right atrium is normal in size.  Aortic Valve: The aortic valve appears abnormal. There is moderate aortic valve cusp calcification. There is no evidence of aortic valve stenosis.  There is no evidence of aortic valve regurgitation. The peak instantaneous gradient of the aortic valve is 20.8 mmHg. The mean gradient of the aortic valve is 11.0 mmHg.  Mitral Valve: The mitral valve is normal in structure. There is moderate mitral annular calcification. There is mild mitral valve regurgitation.  Tricuspid Valve: The tricuspid valve is structurally normal.  "There is mild to moderate tricuspid regurgitation.  Pulmonic Valve: The pulmonic valve is not well visualized. The pulmonic valve regurgitation was not well visualized.  Pericardium: There is no pericardial effusion noted.  Aorta: The aortic root is normal.        CONCLUSIONS:  1. Left ventricular systolic function is normal.  2. LVEF 60+/-5%.  3. There is moderate concentric left ventricular hypertrophy.  4. There is moderate mitral annular calcification.  5. Mild to moderate tricuspid regurgitation.  6. Aortic valve appears abnormal.  7. Aortic valve stenosis is not present.  8. There is moderate aortic valve cusp calcification.       TRICUSPID VALVE/RVSP:  Normal Ranges:  Peak TR Velocity: 3.68 m/s  Est. RA Pressure: 3 mmHg  RV Syst Pressure: 57.2 mmHg (< 30mmHg)       Labwork   Complete Blood Count  No results found for: \"WBC\", \"HGB\", \"HCT\", \"MCV\", \"PLT\"    Peripheral Eosinophil Count/Percentage:   No results found for: \"EOSABS\", \"EOSPCT\"    Serum Immunoglobulin E:    No results found for: \"IGE\"       ASSESSMENT/PLAN     Mr. Chew is a 69 y.o. male and  has a past medical history of Foot ulcer (CMS/HCC). He presents to the [facility] for follow up of SHANTAL and dyspnea and concern for PHTN. PFTs completed but not reliable as patient unable to follow and perform forced volume capacity. Will proceed with Echo, repeat PSG and CT chest to r/o ILD    Problem List and Orders      Assessment and Plan / Recommendations:      Pulmonary hypertension:  This is based on most recent echocardiogram that revealed eRVSP of 57 mmHg.  RV is noted to be normal in size and function which is reassuring.  Resting room air O2 saturation is within normal range.  The patient has chronic dyspnea on exertion that is likely multifactorial in the obesity, deconditioning and chronic A-fib.  Lungs were clear on auscultation today.  No known history of chronic parenchymal lung disease.  We discussed today that " pulmonary hypertension could be seen in the setting of SHANTAL.      Plan for now:  -- Repeat PSG with titration study  -- PFTs - moderate restrictive - difficult to perform unclear true volumes as was not able to perform FVC  -- CXR PA/Lateral with interstitial prominence and small pleural effusions- will proceed with checking CT chest to determine ILD  -- Repeat Echocardiogram prior to next visit (6 months from initial study).      2.   Obstructive sleep apnea:  -- To repeat PSG w/ titration - not completed yet  -- Continue on CPAP for now until repeated study is done.      3.   Obesity     4.  Other co-morbidities: HTN, DM, Afib.     Thank you for visiting the Pulmonary clinic today!   Your breathing medications: will discuss the pulmonary function tests with Dr Baltazar  Tests: will need an ultrasound of the heart called echocardiogram to check on the pumping of the heart and will need a Cat scan of the chest to see the lungs clearer. Also schedule a sleep study       Return to clinic after _6-8____weeks and after  CT scan complete echo and sleep styudy   or sooner if needed   Ana Fabian CNP  My office number is [phone] -     Best way to get a hold of me is to call my office --> Please do not send me follow my health messages  Any test results will be discussed at next visit -- please make sure to make a follow up appt after testing.

## 2024-04-15 ENCOUNTER — TELEPHONE (OUTPATIENT)
Dept: RHEUMATOLOGY | Facility: CLINIC | Age: 69
End: 2024-04-15

## 2024-04-15 ENCOUNTER — OFFICE VISIT (OUTPATIENT)
Dept: PULMONOLOGY | Facility: CLINIC | Age: 69
End: 2024-04-15
Payer: MEDICARE

## 2024-04-15 VITALS
OXYGEN SATURATION: 93 % | SYSTOLIC BLOOD PRESSURE: 144 MMHG | RESPIRATION RATE: 18 BRPM | BODY MASS INDEX: 40.03 KG/M2 | HEART RATE: 73 BPM | HEIGHT: 70 IN | DIASTOLIC BLOOD PRESSURE: 65 MMHG | WEIGHT: 279.6 LBS

## 2024-04-15 DIAGNOSIS — E66.01 CLASS 2 SEVERE OBESITY WITH SERIOUS COMORBIDITY AND BODY MASS INDEX (BMI) OF 39.0 TO 39.9 IN ADULT, UNSPECIFIED OBESITY TYPE (MULTI): ICD-10-CM

## 2024-04-15 DIAGNOSIS — G47.33 OSA ON CPAP: ICD-10-CM

## 2024-04-15 DIAGNOSIS — R93.89 ABNORMAL CXR: ICD-10-CM

## 2024-04-15 DIAGNOSIS — M1A.9XX1 CHRONIC GOUT WITH TOPHUS, UNSPECIFIED CAUSE, UNSPECIFIED SITE: Primary | ICD-10-CM

## 2024-04-15 DIAGNOSIS — I27.20 PULMONARY HYPERTENSION (MULTI): Primary | ICD-10-CM

## 2024-04-15 PROCEDURE — 1036F TOBACCO NON-USER: CPT | Performed by: NURSE PRACTITIONER

## 2024-04-15 PROCEDURE — 3008F BODY MASS INDEX DOCD: CPT | Performed by: NURSE PRACTITIONER

## 2024-04-15 PROCEDURE — 3078F DIAST BP <80 MM HG: CPT | Performed by: NURSE PRACTITIONER

## 2024-04-15 PROCEDURE — 1159F MED LIST DOCD IN RCRD: CPT | Performed by: NURSE PRACTITIONER

## 2024-04-15 PROCEDURE — 99214 OFFICE O/P EST MOD 30 MIN: CPT | Performed by: NURSE PRACTITIONER

## 2024-04-15 PROCEDURE — 3044F HG A1C LEVEL LT 7.0%: CPT | Performed by: NURSE PRACTITIONER

## 2024-04-15 PROCEDURE — 4010F ACE/ARB THERAPY RXD/TAKEN: CPT | Performed by: NURSE PRACTITIONER

## 2024-04-15 PROCEDURE — 3077F SYST BP >= 140 MM HG: CPT | Performed by: NURSE PRACTITIONER

## 2024-04-15 RX ORDER — COLCHICINE 0.6 MG/1
0.6 TABLET ORAL DAILY
Qty: 90 TABLET | Refills: 3 | Status: SHIPPED | OUTPATIENT
Start: 2024-04-15 | End: 2025-04-15

## 2024-04-15 ASSESSMENT — COLUMBIA-SUICIDE SEVERITY RATING SCALE - C-SSRS
6. HAVE YOU EVER DONE ANYTHING, STARTED TO DO ANYTHING, OR PREPARED TO DO ANYTHING TO END YOUR LIFE?: NO
2. HAVE YOU ACTUALLY HAD ANY THOUGHTS OF KILLING YOURSELF?: NO
1. IN THE PAST MONTH, HAVE YOU WISHED YOU WERE DEAD OR WISHED YOU COULD GO TO SLEEP AND NOT WAKE UP?: NO

## 2024-04-15 ASSESSMENT — ENCOUNTER SYMPTOMS
JOINT SWELLING: 1
ACTIVITY CHANGE: 1
LOSS OF SENSATION IN FEET: 1
DEPRESSION: 0
OCCASIONAL FEELINGS OF UNSTEADINESS: 1

## 2024-05-30 DIAGNOSIS — G47.33 OBSTRUCTIVE SLEEP APNEA (ADULT) (PEDIATRIC): ICD-10-CM

## 2024-05-30 DIAGNOSIS — Z99.89 DEPENDENCE ON OTHER ENABLING MACHINES AND DEVICES: ICD-10-CM

## 2024-05-30 RX ORDER — ALLOPURINOL 100 MG/1
100 TABLET ORAL DAILY
Qty: 90 TABLET | Refills: 1 | Status: SHIPPED | OUTPATIENT
Start: 2024-05-30

## 2024-05-31 ENCOUNTER — LAB (OUTPATIENT)
Dept: LAB | Facility: LAB | Age: 69
End: 2024-05-31
Payer: MEDICARE

## 2024-05-31 ENCOUNTER — OFFICE VISIT (OUTPATIENT)
Dept: PRIMARY CARE | Facility: CLINIC | Age: 69
End: 2024-05-31
Payer: MEDICARE

## 2024-05-31 VITALS
TEMPERATURE: 97.7 F | SYSTOLIC BLOOD PRESSURE: 142 MMHG | BODY MASS INDEX: 38.38 KG/M2 | WEIGHT: 259.1 LBS | DIASTOLIC BLOOD PRESSURE: 60 MMHG | OXYGEN SATURATION: 96 % | HEART RATE: 72 BPM | HEIGHT: 69 IN

## 2024-05-31 DIAGNOSIS — E66.01 CLASS 2 SEVERE OBESITY WITH SERIOUS COMORBIDITY AND BODY MASS INDEX (BMI) OF 38.0 TO 38.9 IN ADULT, UNSPECIFIED OBESITY TYPE (MULTI): ICD-10-CM

## 2024-05-31 DIAGNOSIS — I27.20 PULMONARY HYPERTENSION (MULTI): ICD-10-CM

## 2024-05-31 DIAGNOSIS — I10 HYPERTENSION, UNSPECIFIED TYPE: ICD-10-CM

## 2024-05-31 DIAGNOSIS — I10 PRIMARY HYPERTENSION: ICD-10-CM

## 2024-05-31 DIAGNOSIS — I07.1 MODERATE TRICUSPID REGURGITATION: ICD-10-CM

## 2024-05-31 DIAGNOSIS — R12 HEARTBURN: ICD-10-CM

## 2024-05-31 DIAGNOSIS — E11.51 TYPE 2 DIABETES MELLITUS WITH DIABETIC PERIPHERAL ANGIOPATHY WITHOUT GANGRENE (MULTI): ICD-10-CM

## 2024-05-31 DIAGNOSIS — E11.51 TYPE 2 DIABETES MELLITUS WITH DIABETIC PERIPHERAL ANGIOPATHY WITHOUT GANGRENE, WITHOUT LONG-TERM CURRENT USE OF INSULIN (MULTI): ICD-10-CM

## 2024-05-31 DIAGNOSIS — I48.20 CHRONIC ATRIAL FIBRILLATION (MULTI): ICD-10-CM

## 2024-05-31 DIAGNOSIS — E78.2 MIXED HYPERLIPIDEMIA: ICD-10-CM

## 2024-05-31 DIAGNOSIS — G47.33 OBSTRUCTIVE SLEEP APNEA SYNDROME: ICD-10-CM

## 2024-05-31 DIAGNOSIS — K70.30 ALCOHOLIC CIRRHOSIS OF LIVER WITHOUT ASCITES (MULTI): ICD-10-CM

## 2024-05-31 DIAGNOSIS — I50.32 CHRONIC DIASTOLIC HEART FAILURE (MULTI): ICD-10-CM

## 2024-05-31 DIAGNOSIS — Z00.00 MEDICARE ANNUAL WELLNESS VISIT, SUBSEQUENT: Primary | ICD-10-CM

## 2024-05-31 PROBLEM — D84.81 IMMUNODEFICIENCY DUE TO CONDITIONS CLASSIFIED ELSEWHERE (MULTI): Status: RESOLVED | Noted: 2023-11-08 | Resolved: 2024-05-31

## 2024-05-31 PROBLEM — E66.812 CLASS 2 SEVERE OBESITY WITH SERIOUS COMORBIDITY AND BODY MASS INDEX (BMI) OF 38.0 TO 38.9 IN ADULT: Status: ACTIVE | Noted: 2019-08-30

## 2024-05-31 PROBLEM — I77.9 DISORDER OF CAROTID ARTERY (CMS-HCC): Status: RESOLVED | Noted: 2023-09-14 | Resolved: 2024-05-31

## 2024-05-31 PROBLEM — I48.91 ATRIAL FIBRILLATION (MULTI): Status: ACTIVE | Noted: 2018-06-18

## 2024-05-31 PROBLEM — K63.5 COLON POLYPS: Status: RESOLVED | Noted: 2019-12-23 | Resolved: 2024-05-31

## 2024-05-31 PROBLEM — F10.20 ALCOHOLISM (MULTI): Status: RESOLVED | Noted: 2019-08-30 | Resolved: 2024-05-31

## 2024-05-31 PROBLEM — E87.1 HYPONATREMIA: Status: RESOLVED | Noted: 2019-08-30 | Resolved: 2024-05-31

## 2024-05-31 PROBLEM — L97.519 ULCER OF RIGHT FOOT (MULTI): Status: RESOLVED | Noted: 2024-03-25 | Resolved: 2024-05-31

## 2024-05-31 PROBLEM — E11.9 DIABETES MELLITUS (MULTI): Status: ACTIVE | Noted: 2024-05-31

## 2024-05-31 PROBLEM — R26.9 ABNORMAL GAIT: Status: ACTIVE | Noted: 2021-11-04

## 2024-05-31 LAB
ERYTHROCYTE [DISTWIDTH] IN BLOOD BY AUTOMATED COUNT: 15.9 % (ref 11.5–14.5)
EST. AVERAGE GLUCOSE BLD GHB EST-MCNC: 120 MG/DL
HBA1C MFR BLD: 5.8 %
HCT VFR BLD AUTO: 33.6 % (ref 41–52)
HGB BLD-MCNC: 11.2 G/DL (ref 13.5–17.5)
MCH RBC QN AUTO: 32.5 PG (ref 26–34)
MCHC RBC AUTO-ENTMCNC: 33.3 G/DL (ref 32–36)
MCV RBC AUTO: 97 FL (ref 80–100)
NRBC BLD-RTO: 0 /100 WBCS (ref 0–0)
PLATELET # BLD AUTO: 160 X10*3/UL (ref 150–450)
RBC # BLD AUTO: 3.45 X10*6/UL (ref 4.5–5.9)
WBC # BLD AUTO: 4.7 X10*3/UL (ref 4.4–11.3)

## 2024-05-31 PROCEDURE — 80061 LIPID PANEL: CPT

## 2024-05-31 PROCEDURE — 99214 OFFICE O/P EST MOD 30 MIN: CPT | Performed by: NURSE PRACTITIONER

## 2024-05-31 PROCEDURE — 3044F HG A1C LEVEL LT 7.0%: CPT | Performed by: NURSE PRACTITIONER

## 2024-05-31 PROCEDURE — 99497 ADVNCD CARE PLAN 30 MIN: CPT | Performed by: NURSE PRACTITIONER

## 2024-05-31 PROCEDURE — 1160F RVW MEDS BY RX/DR IN RCRD: CPT | Performed by: NURSE PRACTITIONER

## 2024-05-31 PROCEDURE — 1170F FXNL STATUS ASSESSED: CPT | Performed by: NURSE PRACTITIONER

## 2024-05-31 PROCEDURE — G0439 PPPS, SUBSEQ VISIT: HCPCS | Performed by: NURSE PRACTITIONER

## 2024-05-31 PROCEDURE — 3008F BODY MASS INDEX DOCD: CPT | Performed by: NURSE PRACTITIONER

## 2024-05-31 PROCEDURE — 80053 COMPREHEN METABOLIC PANEL: CPT

## 2024-05-31 PROCEDURE — 1123F ACP DISCUSS/DSCN MKR DOCD: CPT | Performed by: NURSE PRACTITIONER

## 2024-05-31 PROCEDURE — 1036F TOBACCO NON-USER: CPT | Performed by: NURSE PRACTITIONER

## 2024-05-31 PROCEDURE — 4010F ACE/ARB THERAPY RXD/TAKEN: CPT | Performed by: NURSE PRACTITIONER

## 2024-05-31 PROCEDURE — 3078F DIAST BP <80 MM HG: CPT | Performed by: NURSE PRACTITIONER

## 2024-05-31 PROCEDURE — 85027 COMPLETE CBC AUTOMATED: CPT

## 2024-05-31 PROCEDURE — 1158F ADVNC CARE PLAN TLK DOCD: CPT | Performed by: NURSE PRACTITIONER

## 2024-05-31 PROCEDURE — 3077F SYST BP >= 140 MM HG: CPT | Performed by: NURSE PRACTITIONER

## 2024-05-31 PROCEDURE — 1159F MED LIST DOCD IN RCRD: CPT | Performed by: NURSE PRACTITIONER

## 2024-05-31 PROCEDURE — 36415 COLL VENOUS BLD VENIPUNCTURE: CPT

## 2024-05-31 PROCEDURE — 83036 HEMOGLOBIN GLYCOSYLATED A1C: CPT

## 2024-05-31 RX ORDER — PANTOPRAZOLE SODIUM 40 MG/1
40 TABLET, DELAYED RELEASE ORAL DAILY
Qty: 90 TABLET | Refills: 2 | Status: SHIPPED | OUTPATIENT
Start: 2024-05-31

## 2024-05-31 RX ORDER — GLIPIZIDE AND METFORMIN HCL 5; 500 MG/1; MG/1
1 TABLET, FILM COATED ORAL 2 TIMES DAILY
Qty: 180 TABLET | Refills: 2 | Status: SHIPPED | OUTPATIENT
Start: 2024-05-31

## 2024-05-31 RX ORDER — ATORVASTATIN CALCIUM 40 MG/1
40 TABLET, FILM COATED ORAL DAILY
Qty: 90 TABLET | Refills: 3 | Status: SHIPPED | OUTPATIENT
Start: 2024-05-31 | End: 2025-05-31

## 2024-05-31 RX ORDER — LISINOPRIL 40 MG/1
40 TABLET ORAL DAILY
Qty: 90 TABLET | Refills: 3 | Status: SHIPPED | OUTPATIENT
Start: 2024-05-31 | End: 2025-05-31

## 2024-05-31 RX ORDER — FUROSEMIDE 20 MG/1
20 TABLET ORAL DAILY
Qty: 90 TABLET | Refills: 3 | Status: SHIPPED | OUTPATIENT
Start: 2024-05-31 | End: 2025-05-31

## 2024-05-31 ASSESSMENT — ENCOUNTER SYMPTOMS
ABDOMINAL PAIN: 0
VOMITING: 0
NERVOUS/ANXIOUS: 0
PALPITATIONS: 0
ARTHRALGIAS: 0
CONSTIPATION: 0
ABDOMINAL DISTENTION: 0
DYSURIA: 0
WHEEZING: 0
ACTIVITY CHANGE: 0
NUMBNESS: 0
UNEXPECTED WEIGHT CHANGE: 0
WOUND: 0
FEVER: 0
EYE PAIN: 0
COUGH: 0
DIARRHEA: 0
APPETITE CHANGE: 0
CHILLS: 0
SHORTNESS OF BREATH: 0
EYE ITCHING: 0
HEMATURIA: 0
FREQUENCY: 0

## 2024-05-31 ASSESSMENT — ACTIVITIES OF DAILY LIVING (ADL)
TAKING_MEDICATION: INDEPENDENT
MANAGING_FINANCES: INDEPENDENT
BATHING: INDEPENDENT
DOING_HOUSEWORK: NEEDS ASSISTANCE
DRESSING: INDEPENDENT
GROCERY_SHOPPING: NEEDS ASSISTANCE

## 2024-05-31 NOTE — ASSESSMENT & PLAN NOTE
Physical activity limited  -recommend to cut back on processed foods, including foods made with flour, sugar, and added salt.

## 2024-05-31 NOTE — ASSESSMENT & PLAN NOTE
Chronic, stable  Follows w cards: Dr Garner. Follows w Nephrology: Dr Manuel Soni current medication: Lasix, metoprolol, lisinopril, amlodipine 10 mg

## 2024-05-31 NOTE — ASSESSMENT & PLAN NOTE
Follows w gi Dr Mateo Nicole  Managed on pantoprazole 40 mg daily, may take an extra dose if heartburn symptoms persist.  Advised by GI doctor to elevate the head of bed 6 to 8 inches, have an early dinner at least 3 hours before sleep, eat small frequent meals, avoid laying down after meals, avoid spices juices soda tomatoes ry oranges caffeine, chocolate, alcohol, NSAIDs, smoking, fatty foods, peppermint  Plan for EGD once patient receives clearance from cardiology and pulmonary  Last EGD per Dr. Díaz in 2014, and recommended EGD every 3 years.  -pt instructed to follow up GI for EGD ASAP (5/31/24)

## 2024-05-31 NOTE — ASSESSMENT & PLAN NOTE
11/2023echo withRVSP of 57 mmHg.    -evaluated by damion Fabian (pulm) on 4/15/24  -plan to repeat PSG with titration study-PSG scheduled 6/25/24.  He continues to use CPAP nightly.  Per proivider note patient is planned to repeat echo in 6 months.

## 2024-05-31 NOTE — ASSESSMENT & PLAN NOTE
Medicare Wellness Visit  Plan for Preventive Care    A good way for you to stay healthy is to use preventive care.  Medicare covers many services that can help you stay healthy.* The goal of these services is to find any health problems as quickly as possible. Finding problems early can help make them easier to treat.  Your personal plan below lists the services you may need and when they are due.      Health Maintenance Summary     COVID-19 Vaccine (1)  Overdue - never done    Pneumococcal Vaccine 65+ (1 - PCV)  Overdue - never done    Shingles Vaccine (1 of 2)  Overdue - never done    Hepatitis C Screening (Once)  Overdue - never done    Medicare Advantage- Medicare Wellness Visit (Yearly - January to December)  Overdue since 1/1/2023    DTaP/Tdap/Td Vaccine (1 - Tdap)  Postponed until 2/18/2024    Osteoporosis Screening (Once)  Postponed until 2/17/2027    Influenza Vaccine (1)  Next due on 9/1/2023    Depression Screening (Yearly)  Next due on 8/17/2024    Meningococcal Vaccine   Aged Out    Hepatitis B Vaccine (For Physician/APC Discussion)   Aged Out    HPV Vaccine   Aged Out    Breast Cancer Screening   Discontinued    Colorectal Cancer Screen-   Discontinued           Preventive Care for Women and Men    Heart Screenings (Cardiovascular):  · Blood tests are used to check your cholesterol, lipid and triglyceride levels. High levels can increase your risk for heart disease and stroke. High levels can be treated with medications, diet and exercise. Lowering your levels can help keep your heart and blood vessels healthy.  Your provider will order these tests if they are needed.    · An ultrasound is done to see if you have an abdominal aortic aneurysm (AAA).  This is an enlargement of one of the main blood vessels that delivers blood to the body.   In the [place], 9,000 deaths are caused by AAA.  You may not even know you have this problem and as many as 1 in 3 people will have a serious problem if it is  Chronic, stable on current medication glipizide/metformin  -Seen by ophthalmology dr Cano -had cataract surgeries (9 and 10/2023)    -Follows w podiatry- Dr Ger Snell   -A1c: 10/23  5.9  Cont on Acei  Cont on statin   Microalbumin 1/2024- Follows w Nephrology dr Jackson   not treated.  Early diagnosis allows for more effective treatment and cure.  If you have a family history of AAA or are a male age 65-75 who has smoked, you are at higher risk of an AAA.  Your provider can order this test, if needed.    Colorectal Screening:  · There are many tests that are used to check for cancer of your colon and rectum. You and your provider should discuss what test is best for you and when to have it done.  Options include:  · Screening Colonoscopy: exam of the entire colon, seen through a flexible lighted tube.  · Flexible Sigmoidoscopy: exam of the last third (sigmoid portion) of the colon and rectum, seen through a flexible lighted tube.  · Cologuard DNA stool test: a sample of your stool is used to screen for cancer and unseen blood in your stool.  · Fecal Occult Blood Test: a sample of your stool is studied to find any unseen blood    Flu Shot:  · An immunization that helps to prevent influenza (the flu). You should get this every year. The best time to get the shot is in the fall.    Pneumococcal Shot:  • Vaccines help prevent pneumococcal disease, which is any type of illness caused by Streptococcus pneumoniae bacteria. There are two kinds of pneumococcal vaccines available in the [place]:   o Pneumococcal conjugate vaccines (PCV20 or Ehrjprr75®)  o Pneumococcal polysaccharide vaccine (PPSV23 or [id number]®)  · For those who have never received any pneumococcal conjugate vaccine, CDC recommends PVC20 for adults 65 years or older and adults 19 through 64 years old with certain medical conditions or risk factors.   · For those who have previously received PCV13, this should be followed by a dose of PPSV23.     Hepatitis B Shot:  · An immunization that helps to protect people from getting Hepatitis B. Hepatitis B is a virus that spreads through contact with infected blood or body fluids. Many people with the virus do not have symptoms.  The virus can lead to serious problems, such as  liver disease. Some people are at higher risk than others. Your doctor will tell you if you need this shot.     Diabetes Screening:  · A test to measure sugar (glucose) in your blood is called a fasting blood sugar. Fasting means you cannot have food or drink for at least 8 hours before the test. This test can detect diabetes long before you may notice symptoms.    Glaucoma Screening:  · Glaucoma screening is performed by your eye doctor. The test measures the fluid pressure inside your eyes to determine if you have glaucoma.     Hepatitis C Screening:  · A blood test to see if you have the hepatitis C virus.  Hepatitis C attacks the liver and is a major cause of chronic liver disease.  Medicare will cover a single screening for all adults born between 1945 & 1965, or high risk patients (people who have injected illegal drugs or people who have had blood transfusions).  High risk patients who continue to inject illegal drugs can be screened for Hepatitis C every year.    Smoking and Tobacco-Use Cessation Counseling:  · Tobacco is the single greatest cause of disease and early death in our country today. Medication and counseling together can increase a person’s chance of quitting for good.   · Medicare covers two quitting attempts per year, with four counseling sessions per attempt (eight sessions in a 12 month period)    Preventive Screening tests for Women    Screening Mammograms and Breast Exams:  · An x-ray of your breasts to check for breast cancer before you or your doctor may be able to feel it.  If breast cancer is found early it can usually be treated with success.    Pelvic Exams and Pap Tests:  · An exam to check for cervical and vaginal cancer. A Pap test is a lab test in which cells are taken from your cervix and sent to the lab to look for signs of cervical cancer. If cancer of the cervix is found early, chances for a cure are good. Testing can generally end at age 65, or if a woman has a hysterectomy  for a benign condition. Your provider may recommend more frequent testing if certain abnormal results are found.    Bone Mass Measurements:  · A painless x-ray of your bone density to see if you are at risk for a broken bone. Bone density refers to the thickness of bones or how tightly the bone tissue is packed.    Preventive Screening tests for Men    Prostate Screening:  · Should you have a prostate cancer test (PSA)?  It is up to you to decide if you want a prostate cancer test. Talk to your clinician to find out if the test is right for you.  Things for you to consider and talk about should include:  · Benefits and harms of the test  · Your family history  · How your race/ethnicity may influence the test  · If the test may impact other medical conditions you have  · Your values on screenings and treatments    *Medicare pays for many preventive services to keep you healthy. For some of these services, you might have to pay a deductible, coinsurance, and / or copayment.  The amounts vary depending on the type of services you need and the kind of Medicare health plan you have.    For further details on screenings offered by Medicare please visit: https://www.medicare.gov/coverage/preventive-screening-services

## 2024-05-31 NOTE — ASSESSMENT & PLAN NOTE
Uses CPAP every night 14, O2  2l   Plan for repeat PSG with titration study-PSG scheduled 6/25/24.

## 2024-05-31 NOTE — ASSESSMENT & PLAN NOTE
Chronic, stable on current medication glipizide/metformin  -Seen by ophthalmology dr Cano -had cataract surgeries (9 and 10/2023)    -Follows w podiatry- Dr Ger Snell   -A1c: 10/23  5.9  Cont on Acei  Cont on statin   Microalbumin 1/2024- Follows w Nephrology dr Jackson

## 2024-05-31 NOTE — PROGRESS NOTES
Ricardo Chew male   1955 69 y.o.   [id number]    Chief Complaint  Medicare Annual Wellness Visit Subsequent.    History Of Present Illness  Ricardo Chew is a 69 y.o. male presents today    Since last visit Ricardo was evaluated by Dr Garner 3/26/24- note reviewed. No changes were made in cardiac medications.  Reports that he is compliant w all medications.   He is tolerating the Eliquis well.  Denies bleeding complications.  Denies falls.    Was also evaluated by a Ana Fabian (pulm) on 4/15/24-evaluated for pulmonary hypertension due to echo withRVSP of 57 mmHg.  Plan was established to repeat PSG with titration study-PSG scheduled 6/25/24.  He continues to use CPAP nightly.  Per proivider note patient is planned to repeat echo in 6 months.       Ricardo has not had any inpatient hospitalizations but did have 1 emergency room visit on 4/29/2024.  Unable to review these records as they are not available in epic, but Ricardo reports that this visit was regarding a wound on the left lower extremity which was weeping.  Since then he has been following with the wound care clinic at [facility].  He is receiving wound care twice per week.  Reports that he has home care visits from nursing and every other week he is seen at the wound care center.  Reports that the wound continues to improve.    Denies fevers, chills,n/v.       Denies acute concerns today.      Chronic conditions reviewed:     For diabetes follows w Ophthalmology regularly.  Denies hypoglycemia.   Remains compliant w medications.     Review of Systems  Review of Systems   Constitutional:  Negative for activity change (ambulates w walker), appetite change, chills, fever and unexpected weight change.   HENT:  Negative for congestion.    Eyes:  Negative for pain and itching.   Respiratory:  Negative for cough, shortness of breath and wheezing.    Cardiovascular:  Negative for chest pain, palpitations and leg swelling.   Gastrointestinal:  Negative for  abdominal distention, abdominal pain, constipation, diarrhea and vomiting.   Genitourinary:  Negative for dysuria, frequency, hematuria and urgency.        Nocturia once or twice- chronic   Musculoskeletal:  Negative for arthralgias and gait problem.   Skin:  Negative for rash and wound.   Neurological:  Negative for numbness.   Psychiatric/Behavioral:  The patient is not nervous/anxious.        Diet:  Exercise:  Dental:  Ophtho:     Screenings: Pap, Mammo,   Colonoscopy 8/2023   Immunizations: flu, Tdap, covid, pneum, shingles    Past Medical History  He has a past medical history of Disorder of carotid artery (CMS-HCC) (09/14/2023) and Foot ulcer (Multi).    Surgical History  He has a past surgical history that includes Cataract extraction.    Family History  Family History   Problem Relation Name Age of Onset    Hypertension Father      Heart failure Father      Parkinsonism Father      Other (Cardiac Disorder) Brother      Other (Cardiac Pacemaker) Brother      Parkinsonism Brother          Social History  He reports that he quit smoking about 51 years ago. His smoking use included cigarettes. He has never been exposed to tobacco smoke. He has never used smokeless tobacco. He reports current alcohol use. He reports that he does not use drugs.    DEPRESSION SCREEN       Allergies  Ketorolac    Medications  Current Outpatient Medications   Medication Instructions    allopurinol (ZYLOPRIM) 100 mg, oral, Daily    amLODIPine (NORVASC) 10 mg, oral, Daily    atorvastatin (Lipitor) 40 mg tablet 1 tablet, oral, Daily, TAKE 1 TABLET Before meals    cholecalciferol (Vitamin D-3) 50 MCG (2000 UT) tablet 1 tablet, oral, Daily    colchicine 0.6 mg, oral, Daily, Take 1 tablet daily    dapagliflozin propanediol (FARXIGA) 10 mg, oral, Daily    Eliquis 5 mg, oral, 2 times daily    folic acid (FOLVITE) 0.8 mg, oral, Daily    furosemide (Lasix) 20 mg tablet 1 tablet, oral, Daily, Take 1 tablet daily    glipizide-metformin  "(Metaglip) 5-500 mg tablet 1 tablet, oral, 2 times daily    lisinopril 40 mg tablet 1 tablet, oral, Daily, Take 1 tablet daily    metoprolol succinate XL (TOPROL-XL) 200 mg, oral, Daily, Do not crush or chew. - Take 1 tablet daily    multivit-minerals/folic acid (CENTRUM ADULTS ORAL) oral    multivitamin tablet 1 tablet, oral, Daily    multivitamin with minerals (multivitamin-iron-folic acid) tablet 15 mL    pantoprazole (PROTONIX) 40 mg, oral, Daily        Objective   There were no vitals taken for this visit.   BMI: Estimated body mass index is 40.12 kg/m² as calculated from the following:    Height as of 4/15/24: 1.778 m (5' 10\").    Weight as of 4/15/24: 127 kg (279 lb 9.6 oz).    Physical Exam  Physical Exam  Vitals and nursing note reviewed.   Constitutional:       Appearance: Normal appearance. He is obese.   HENT:      Head: Normocephalic and atraumatic.      Nose: Nose normal.      Mouth/Throat:      Mouth: Mucous membranes are moist.      Pharynx: Oropharynx is clear.      Comments: Left lip droop (chronic)   Eyes:      Extraocular Movements: Extraocular movements intact.      Conjunctiva/sclera: Conjunctivae normal.      Pupils: Pupils are equal, round, and reactive to light.   Cardiovascular:      Rate and Rhythm: Normal rate. Rhythm irregular.      Pulses: Normal pulses.      Heart sounds: Normal heart sounds.   Pulmonary:      Effort: Pulmonary effort is normal.      Breath sounds: Normal breath sounds.   Abdominal:      General: Bowel sounds are normal.      Palpations: Abdomen is soft.      Tenderness: There is no abdominal tenderness.   Musculoskeletal:         General: Normal range of motion.      Right elbow: Deformity present.      Left elbow: Deformity present.      Cervical back: Neck supple.      Comments:  +1B LE edema, B shins w dark discolorationn (venous stasis), B LE w vericose veins. No open areas. R heel callus- intact. no open lesion on B feet, Gouty bursitis B olecranon    Skin:     " General: Skin is warm and dry.   Neurological:      General: No focal deficit present.      Mental Status: He is alert and oriented to person, place, and time. Mental status is at baseline.   Psychiatric:         Mood and Affect: Mood normal. Affect is flat.         Behavior: Behavior normal.         Thought Content: Thought content normal.         Judgment: Judgment normal.         Relevant Results and Imaging  Hospital Outpatient Visit on 03/27/2024   Component Date Value Ref Range Status    FVC - Predicted 03/27/2024 4.08   Final    FEV1 - Predicted 03/27/2024 3.09   Final    FVC - PRE 03/27/2024 2.48   Final    FEV1 - Pre 03/27/2024 1.60   Final   Lab on 01/31/2024   Component Date Value Ref Range Status    Hemoglobin A1C 01/31/2024 5.8 (H)  see below % Final    Estimated Average Glucose 01/31/2024 120  Not Established mg/dL Final    Prostate Specific Antigen,Screen 01/31/2024 0.60  <=4.00 ng/mL Final   Orders Only on 01/10/2024   Component Date Value Ref Range Status    [facility] BUN/Creat Ratio 01/10/2024 21.7   Final    [facility] CO2, venous 01/10/2024 24.0  20.0 - 31.0 mmol/L Final    [facility] Creatinine 01/10/2024 1.2 (H)  0.6 - 1.1 mg/dL Final    [facility] K 01/10/2024 4.4  3.5 - 5.1 mmol/L Final    [facility] BUN 01/10/2024 26 (H)  9 - 23 mg/dL Final    Comment: -  Venipuncture should occur prior to N-Acetyl Cysteine (NAC) or Metamizole (Sulpyrine) administration due to the potential for falsely depressed results.  -  Blood samples from some patients with monoclonal gammopathies may produce falsely elevated results      [facility] Calculated Osmolality 01/10/2024 277  275 - 295 mOsm/kg Final    [facility] Calcium 01/10/2024 9.7  8.7 - 10.4 mg/dL Final    [facility] Chloride 01/10/2024 100  98 - 107 mmol/L Final    [facility] GFR AA 01/10/2024 >60   Final    Comment:  GFR CalcMedical judgement is necessary to interpret GFR.  The calculated GFR may not accurately reflect renal status in  patients >70 years, pregnant women, acutely ill hospitalized patients and patients with acute renal failure or known renal disease.  The MDRD GFR formula is valid only for adults greater than 18 years of age.  Note:  Creatinine clearance (not GFR) should be used for drug dosing.      [facility] Na 01/10/2024 133 (L)  135 - 145 mmol/L Final    [facility] Glucose 01/10/2024 196 (H)  74 - 106 mg/dL Final    [facility] Glomerular Filtration R* 01/10/2024 >60  mL/min/1.73m? Final    Comment: Non- GFR CalcMedical judgement is necessary to interpret GFR.  The calculated GFR may not accurately reflect renal status in patients >70 years, pregnant women, acutely ill hospitalized patients and patients with acute renal failure or known renal disease.  The MDRD GFR formula is valid only for adults greater than 18 years of age.  Note:  Creatinine clearance (not GFR) should be used for drug dosing.     Orders Only on 01/03/2024   Component Date Value Ref Range Status    [facility] Creatinine Random, U 01/03/2024 15.0  mg/dL Final    [facility] Total Protein, Random U* 01/03/2024 <6  1 - 14 mg/dL Final    [facility] Na 01/03/2024 128 (L)  135 - 145 mmol/L Final    [facility] BUN/Creat Ratio 01/03/2024 25.4   Final    [facility] Creatinine 01/03/2024 1.3 (H)  0.6 - 1.1 mg/dL Final    [facility] GFR AA 01/03/2024 >60   Final    Comment:  GFR CalcMedical judgement is necessary to interpret GFR.  The calculated GFR may not accurately reflect renal status in patients >70 years, pregnant women, acutely ill hospitalized patients and patients with acute renal failure or known renal disease.  The MDRD GFR formula is valid only for adults greater than 18 years of age.  Note:  Creatinine clearance (not GFR) should be used for drug dosing.      [facility] CO2, venous 01/03/2024 26.0  20.0 - 31.0 mmol/L Final    [facility] K 01/03/2024 5.8 (H)  3.5 - 5.1 mmol/L Final    Specimen slightly hemolyzed. Results may be  affected.    [facility] Calculated Osmolality 01/03/2024 271 (L)  275 - 295 mOsm/kg Final    [facility] Calcium 01/03/2024 10.0  8.7 - 10.4 mg/dL Final    [facility] BUN 01/03/2024 33 (H)  9 - 23 mg/dL Final    Comment: -  Venipuncture should occur prior to N-Acetyl Cysteine (NAC) or Metamizole (Sulpyrine) administration due to the potential for falsely depressed results.  -  Blood samples from some patients with monoclonal gammopathies may produce falsely elevated results      [facility] 01/03/2024 217 (H)  74 - 106 mg/dL Final    [facility] Chloride 01/03/2024 97 (L)  98 - 107 mmol/L Final    NON- HIE Glomerular Filtration R* 01/03/2024 55  mL/min/1.73m? Final    Comment: Non- GFR CalcMedical judgement is necessary to interpret GFR.  The calculated GFR may not accurately reflect renal status in patients >70 years, pregnant women, acutely ill hospitalized patients and patients with acute renal failure or known renal disease.  The MDRD GFR formula is valid only for adults greater than 18 years of age.  Note:  Creatinine clearance (not GFR) should be used for drug dosing.      [facility] Vit D 25 01/03/2024 29  ng/mL Final    Comment: Less than 20 ng/mL  Deficient  20 ? 30 ng/mL   Insufficient  30 ? 100 ng/mL   Sufficiency  Greater than 100 ng/mL Potential Toxicity      [facility]TH 01/03/2024 43.0  18.4 - 80.1 pg/mL Final    Comment: - Interpretation of intact PTH values should take into account serum calcium results and the interrelationship between these two elements in various disorders involving PTH and calcium  - Measurement of intact PTH is useful in differentiating between hypercalcemia due to       hyperparathyroidism and hypercalcemia of malignancy  - The assay is not intended as, and should not be relied upon as, a diagnostic indicator of malignancy  - In patients with abnormal renal function, interpret the PTH result with caution, and do not make patient management decisions  on the PTH result alone      [facility] Microalbumin, Urine mg/L 01/03/2024 24.0  mg/L Final    [facility] Creatinine, Urine mg/dl 01/03/2024 15.2  mg/dL Final    [facility] Microalbumin/Creatinine* 01/03/2024 158 (H)  0 - 30 mg MALB/gm CREAT Final   Clinical Support on 12/29/2023   Component Date Value Ref Range Status    POC INR 12/29/2023 1.40   Final   Clinical Support on 12/01/2023   Component Date Value Ref Range Status    POC INR 12/01/2023 2.10   Final   Clinical Support on 11/14/2023   Component Date Value Ref Range Status    POC INR 11/14/2023 1.50   Final   Hospital Outpatient Visit on 11/07/2023   Component Date Value Ref Range Status    AV mn grad 11/07/2023 11.0   Final    AV pk clemente 11/07/2023 2.28   Final    LVOT diam 11/07/2023 2.00   Final    MV avg E/e' ratio 11/07/2023 11.50   Final    RVSP 11/07/2023 57.2   Final    LVIDd 11/07/2023 4.80   Final    Aortic Valve Area by Continuity of* 11/07/2023 1.49   Final    AV pk grad 11/07/2023 20.8   Final    Aortic Valve Area by Continuity of* 11/07/2023 1.48   Final    LV A4C EF 11/07/2023 58.8   Final   Ancillary Procedure on 11/07/2023   Component Date Value Ref Range Status    BSA 11/07/2023 2.42  m2 Final   Clinical Support on 10/31/2023   Component Date Value Ref Range Status    POC INR 10/31/2023 1.80   Final   There may be more visits with results that are not included.     No images are attached to the encounter.        Assessment and Plan  Assessment/Plan

## 2024-05-31 NOTE — ASSESSMENT & PLAN NOTE
-afib diagnosed 2008  -6/28/2018 Pharm Stress test -normal cardiac nuclear perfusion study except for fixed defect likely due to attenuation artifact.  Normal left ventricular systolic function EF 75%  -11/2023 TTE-EF 60 to 65%, moderate left ventricular hypertrophy, moderate mitral annular calcification, mild to moderate tricuspid regurg, rvsp 57.2  -now managed by cardiology- Dr Garner.  Warfarin discontinued 1/2024, eliquis started. Tolerating well.

## 2024-06-01 LAB
ALBUMIN SERPL BCP-MCNC: 4.3 G/DL (ref 3.4–5)
ALP SERPL-CCNC: 110 U/L (ref 33–136)
ALT SERPL W P-5'-P-CCNC: 27 U/L (ref 10–52)
ANION GAP SERPL CALC-SCNC: 17 MMOL/L (ref 10–20)
AST SERPL W P-5'-P-CCNC: 25 U/L (ref 9–39)
BILIRUB SERPL-MCNC: 1.5 MG/DL (ref 0–1.2)
BUN SERPL-MCNC: 17 MG/DL (ref 6–23)
CALCIUM SERPL-MCNC: 9.8 MG/DL (ref 8.6–10.6)
CHLORIDE SERPL-SCNC: 98 MMOL/L (ref 98–107)
CHOLEST SERPL-MCNC: 131 MG/DL (ref 0–199)
CHOLESTEROL/HDL RATIO: 3.6
CO2 SERPL-SCNC: 25 MMOL/L (ref 21–32)
CREAT SERPL-MCNC: 0.79 MG/DL (ref 0.5–1.3)
EGFRCR SERPLBLD CKD-EPI 2021: >90 ML/MIN/1.73M*2
GLUCOSE SERPL-MCNC: 119 MG/DL (ref 74–99)
HDLC SERPL-MCNC: 36.2 MG/DL
LDLC SERPL CALC-MCNC: 70 MG/DL
NON HDL CHOLESTEROL: 95 MG/DL (ref 0–149)
POTASSIUM SERPL-SCNC: 3.8 MMOL/L (ref 3.5–5.3)
PROT SERPL-MCNC: 7 G/DL (ref 6.4–8.2)
SODIUM SERPL-SCNC: 136 MMOL/L (ref 136–145)
TRIGL SERPL-MCNC: 126 MG/DL (ref 0–149)
VLDL: 25 MG/DL (ref 0–40)

## 2024-06-06 ENCOUNTER — TELEPHONE (OUTPATIENT)
Dept: CARDIOLOGY | Facility: CLINIC | Age: 69
End: 2024-06-06
Payer: MEDICARE

## 2024-06-06 NOTE — TELEPHONE ENCOUNTER
----- Message from Kirt Garner MD sent at 6/6/2024  2:31 PM EDT -----  Blood sugar elevated at 119, kidneys and liver function are normal, blood count is mildly low, recommend following up with your PCP, cholesterol profile overall acceptable with normal HDL recommend increasing activity and weight loss.

## 2024-06-25 ENCOUNTER — APPOINTMENT (OUTPATIENT)
Dept: SLEEP MEDICINE | Facility: CLINIC | Age: 69
End: 2024-06-25
Payer: MEDICARE

## 2024-06-26 DIAGNOSIS — I48.20 CHRONIC ATRIAL FIBRILLATION (MULTI): ICD-10-CM

## 2024-06-26 RX ORDER — APIXABAN 5 MG/1
5 TABLET, FILM COATED ORAL 2 TIMES DAILY
Qty: 60 TABLET | Refills: 2 | Status: SHIPPED | OUTPATIENT
Start: 2024-06-26

## 2024-07-03 ENCOUNTER — APPOINTMENT (OUTPATIENT)
Dept: PULMONOLOGY | Facility: CLINIC | Age: 69
End: 2024-07-03
Payer: MEDICARE

## 2024-07-15 ASSESSMENT — ENCOUNTER SYMPTOMS
ACTIVITY CHANGE: 1
JOINT SWELLING: 1

## 2024-07-15 NOTE — PROGRESS NOTES
Patient: Ricardo Chew    [id number]  : 1955 -- AGE 69 y.o.    Provider: DAJUAN Wagner   Location [facility]   Service Date: 4/15/2024       Department of Medicine  Division of Pulmonary, Critical Care, and Sleep Medicine         [facility]  Follow Up Visit Note    Virtual or Telephone Consent  A telephone visit (audio only) between the patient (at the originating site) and the provider (at the distant site) was utilized to provide this telehealth service.   Verbal consent was requested and obtained from Ricardo Chew on this date, 24 for a telehealth visit.       HISTORY OF PRESENT ILLNESS     HISTORY OF PRESENT ILLNESS   Mr. Chew is a 69-year-old man with past medical history of hypertension, diabetes mellitus type 2, hyperlipidemia, obesity, obstructive sleep apnea on CPAP, chronic atrial fibrillation and history of alcoholic cirrhosis who presented to the office today for evaluation of possible pulmonary hypertension based on most recent echocardiogram.    DATE OF LAST VISIT: 2024 by Dr. Baltazar       Current History 2024     On today's visit, the patient reports no breathing issues since last issue. He is having left leg issues and going to see podiatrist  and going to wound care.  He has a wound that is draiing ad is wearing wraps due to lymphedema    Denies cough, wheezing, Fevers/chills, SOB at rest denies chest pain   He is walking around his building 1-2 times a day which a 1/4 mile each time, he stops a couple minutes  Denies runny nose or PND or allergies  Denies GERD  Using CPAP - has a sleep study scheduled 24  He reports having an echo [facility] but unknown results       Current History 4/15/2024    On today's visit, the patient reports no ER visits. Denies cough or wheezing or chest pain or fever or chills.   He walks with a rollator, he was not SKAGGS walking from [facility] to office.   He lives in assistive  living.     Allergies - denies runny nose or tingling in his throat  GERD denies   Has SHANTAL - using CPAP every night for 8 hrs. Has supplies - possible health care solutions. - needs repeat study reports next available is June        Office Visit    2/19/2024  [facility]     Department of Medicine  Division of Pulmonary, Critical Care, and Sleep Medicine  Consultation  [facility] - [address], [address]     I was asked by Patricia Bingham, KALIN*, to evaluate Mr. Ricardo Chew for possible pulmonary hypertension. I have independently interviewed and examined the patient in the office and reviewed available records.     Physician HPI (2/20/2024):  Mr. Chew is a 69-year-old man with past medical history of hypertension, diabetes mellitus type 2, hyperlipidemia, obesity, obstructive sleep apnea on CPAP, chronic atrial fibrillation and history of alcoholic cirrhosis who presented to the office today for evaluation of possible pulmonary hypertension based on most recent echocardiogram.     Echocardiogram done in November 2023 revealed:  LV hypertrophy with EF 60-65%  Mild to moderate tricuspid regurgitation  RV normal in size and function  eRVSP 57 mmHg     The patient reports chronic dyspnea on exertion that has not changed in the past 6 months.  No acute respiratory symptoms today such as persistent cough, wheezing or exertional chest pain.  He reports he has been compliant with CPAP every night.  His most recent sleep study was done more than 10 years ago.  He recently moved to [place] from [place] and thus previous medical records were not available for me to review today.  He is a lifetime non-smoker with no past history of pulmonary disease or recurrent sinopulmonary infections.  Has chronic lower extremity edema (about 1+).  Denies alcohol abuse now.              Electronically signed by Jhon Desouza MD at 2/20/2024  9:14 A           REVIEW OF SYSTEMS     REVIEW  OF SYSTEMS  Review of Systems   Constitutional:  Positive for activity change.   Musculoskeletal:  Positive for joint swelling.        Bilateral elbows swollen   All other systems reviewed and are negative.        ALLERGIES AND MEDICATIONS     ALLERGIES  Allergies   Allergen Reactions    Ketorolac Other     Uncontrolled bloody nose       MEDICATIONS  Current Outpatient Medications   Medication Sig Dispense Refill    allopurinol (Zyloprim) 100 mg tablet TAKE 1 TABLET DAILY 90 tablet 1    amLODIPine (Norvasc) 10 mg tablet Take 1 tablet (10 mg) by mouth once daily. 90 tablet 3    atorvastatin (Lipitor) 40 mg tablet Take 1 tablet (40 mg) by mouth once daily. TAKE 1 TABLET Before meals 90 tablet 3    cholecalciferol (Vitamin D-3) 50 MCG (2000 UT) tablet Take 1 tablet (2,000 Units) by mouth once daily.      colchicine 0.6 mg tablet Take 1 tablet (0.6 mg) by mouth once daily. Take 1 tablet daily 90 tablet 3    dapagliflozin propanediol (Farxiga) 10 mg Take 1 tablet (10 mg) by mouth once daily. 90 tablet 3    Eliquis 5 mg tablet TAKE 1 TABLET BY MOUTH TWICE A DAY 60 tablet 2    folic acid (Folvite) 800 mcg tablet Take 1 tablet (0.8 mg) by mouth once daily.      furosemide (Lasix) 20 mg tablet Take 1 tablet (20 mg) by mouth once daily. Take 1 tablet daily 90 tablet 3    glipizide-metformin (Metaglip) 5-500 mg tablet Take 1 tablet by mouth 2 times a day. 180 tablet 2    lisinopril 40 mg tablet Take 1 tablet (40 mg) by mouth once daily. Take 1 tablet daily 90 tablet 3    metoprolol succinate XL (Toprol-XL) 200 mg 24 hr tablet Take 1 tablet (200 mg) by mouth once daily. Do not crush or chew. - Take 1 tablet daily 90 tablet 3    multivit-minerals/folic acid (CENTRUM ADULTS ORAL) Take by mouth.      multivitamin tablet Take 1 tablet by mouth once daily.      multivitamin with minerals (multivitamin-iron-folic acid) tablet 15 mL.      pantoprazole (ProtoNix) 40 mg EC tablet Take 1 tablet (40 mg) by mouth once daily. 90 tablet 2      No current facility-administered medications for this visit.         PAST HISTORY     PAST MEDICAL HISTORY  He  has a past medical history of Colon polyps (2019), Disorder of carotid artery (CMS-HCC) (2023), Foot ulcer (Multi), and Hyponatremia (2019).       PAST SURGICAL HISTORY  Past Surgical History:   Procedure Laterality Date    CATARACT EXTRACTION         IMMUNIZATION HISTORY  Immunization History   Administered Date(s) Administered    Flu vaccine (IIV4), preservative free *Check age/dose* 2015, 10/24/2016    Flu vaccine, quadrivalent, high-dose, preservative free, age 65y+ (FLUZONE) 2023    Influenza, Unspecified 10/06/2011, 10/01/2013, 10/01/2015, 10/25/2016, 2017, 10/30/2018, 2019    Influenza, seasonal, injectable 10/01/2022    Moderna COVID-19 vaccine, Fall , 12 yeasrs and older (50mcg/0.5mL) 10/11/2023    Pfizer Purple Cap SARS-CoV-2 2021, 2021, 2021, 2022, 10/26/2022    Pneumococcal conjugate vaccine, 20-valent (PREVNAR 20) 2022    Pneumococcal polysaccharide vaccine, 23-valent, age 2 years and older (PNEUMOVAX 23) 2011    Tdap vaccine, age 7 year and older (BOOSTRIX, ADACEL) 2016    Zoster vaccine, recombinant, adult (SHINGRIX) 10/30/2018, 2021, 2021, 03/15/2021, 03/15/2021    Zoster, live 2015, 10/29/2018, 10/30/2018, 2021, 03/15/2021       SOCIAL HISTORY  He  reports that he has never smoked. He has never been exposed to tobacco smoke. He has never used smokeless tobacco. He reports current alcohol use of about 4.0 standard drinks of alcohol per week. He reports that he does not use drugs. He Patient   Social History:  Social History            Tobacco Use    Smoking status: Former       Packs/day: 1       Types: Cigarettes       Quit date: 1973       Years since quittin.1       Passive exposure: Never    Smokeless tobacco: Never   Substance Use Topics    Alcohol use: Yes        Comment: weekdays: 3 days per week 4-5 beers,  On weekend 5-6 beers while watching sports    Drug use: Never         Occupational & Environmental History:   Retired      FAMILY HISTORY  Family History   Problem Relation Name Age of Onset    Hypertension Father      Heart failure Father      Parkinsonism Father      Other (Cardiac Disorder) Brother      Other (Cardiac Pacemaker) Brother      Parkinsonism Brother           PHYSICAL EXAM     VITAL SIGNS: There were no vitals taken for this visit. There were no vitals taken for this visit.     PREVIOUS WEIGHTS:  Wt Readings from Last 3 Encounters:   05/31/24 118 kg (259 lb 1.6 oz)   04/15/24 127 kg (279 lb 9.6 oz)   03/26/24 125 kg (276 lb)       Physical Exam  Pulmonary:      Effort: Pulmonary effort is normal.      Comments: Poor inspiratory effort, diminished bases  Musculoskeletal:      Comments: Bilateral elbows swollen   Neurological:      General: No focal deficit present.      Mental Status: He is alert and oriented to person, place, and time. Mental status is at baseline.   Psychiatric:         Mood and Affect: Mood normal.         Behavior: Behavior normal.         Thought Content: Thought content normal.         Judgment: Judgment normal.           RESULTS/DATA     Pulmonary Function Test Results        Pulmonary Function Test - Scan on 4/16/2024  2:07 PM: PFT's done on  3/27/24                   Chest Radiograph     XR chest 2 views 03/27/2024    Narrative  Interpreted By:  Kody Lopez,  STUDY:  XR CHEST 2 VIEWS;  3/27/2024 10:00 am    INDICATION:  Signs/Symptoms:dyspnea.    COMPARISON:  None.    FINDINGS:  PA and lateral radiographs of the chest were provided.    CARDIOMEDIASTINAL SILHOUETTE:  Cardiomediastinal silhouette is normal in size and configuration.    LUNGS:  Small right basilar pleural effusion and atelectasis. Trace left  basilar pleural effusion. Mild diffuse interstitial prominence. No  pneumothorax. No dense focal  consolidation.    ABDOMEN:  No remarkable upper abdominal findings.    BONES:  No acute osseous changes.    Impression  1.  Small right and trace left basilar pleural effusions and  atelectasis.  2. Mild diffuse interstitial prominence. Correlate with volume status  and concern for interstitial edema.    Chest CT Scan     5/20/2024 CT chest   Note PROCEDURE: CT CHEST WITHOUT IV CONTRAST  HISTORY: WHAT SYMPTOMS ARE YOU EXPERIENCING?; ABNORMAL CXR I27.20, R93.89  COMPARISON: Chest x-ray, 4/29/2024  TECHNIQUE: Multiple contiguous axial CT images of the chest were obtained without the administration of intravenous contrast. Sagittal and coronal reconstructions were performed. All CT scans at this facility use dose modulation, iterative reconstruction, and/or weight based dosing when appropriate to reduce radiation dose to as low as reasonably achievable.  FINDINGS:  Mediastinum: Atherosclerotic aortic and coronary arterial calcifications noted. The heart is mildly enlarged. The heart and great vessels are otherwise unremarkable. There is no pericardial effusion. No evidence of mediastinal lymphadenopathy.  Lungs: The lung parenchyma appears unremarkable. No evidence of focal consolidation. No soft tissue nodules are seen. No pleural effusion or pneumothorax identified.  Upper Abdomen: The visualized upper abdominal contents reveal no acute process.  Bones and Soft Tissues: No acute osseous abnormalities are identified. The soft tissues are unremarkable.  IMPRESSION:  No acute cardiopulmonary process.  Mild cardiomegaly and atherosclerotic vascular calcifications incidentally noted.  Electronically signed by: Joao Harris MD 05/20/2024 11:04 AM EDT Workstation: [id number]  Technologist: FARRAH  Dictated By: JOAO HARRIS MD  Signed By: JOAO HARRIS MD  Signed Out: 05/20/24 11:04:29         Echocardiogram     TRANSTHORACIC ECHOCARDIOGRAM REPORT 11/7/2023     Patient Name:      LOGAN Orozco Physician:     [id number] Damian Garner MD  Study Date:        11/7/2023            Ordering Provider:    [id number] DAMIAN GARNER     PHYSICIAN INTERPRETATION:  Left Ventricle: Left ventricular systolic function is normal. The left ventricular cavity size is normal. There is moderate concentric left ventricular hypertrophy. Left ventricular diastolic filling was indeterminate. LVEF 60+/-5%.  Left Atrium: The left atrium is upper limits of normal in size.  Right Ventricle: The right ventricle is normal in size. There is normal right ventricular global systolic function.  Right Atrium: The right atrium is normal in size.  Aortic Valve: The aortic valve appears abnormal. There is moderate aortic valve cusp calcification. There is no evidence of aortic valve stenosis.  There is no evidence of aortic valve regurgitation. The peak instantaneous gradient of the aortic valve is 20.8 mmHg. The mean gradient of the aortic valve is 11.0 mmHg.  Mitral Valve: The mitral valve is normal in structure. There is moderate mitral annular calcification. There is mild mitral valve regurgitation.  Tricuspid Valve: The tricuspid valve is structurally normal. There is mild to moderate tricuspid regurgitation.  Pulmonic Valve: The pulmonic valve is not well visualized. The pulmonic valve regurgitation was not well visualized.  Pericardium: There is no pericardial effusion noted.  Aorta: The aortic root is normal.        CONCLUSIONS:   1. Left ventricular systolic function is normal.   2. LVEF 60+/-5%.   3. There is moderate concentric left ventricular hypertrophy.   4. There is moderate mitral annular calcification.   5. Mild to moderate tricuspid regurgitation.   6. Aortic valve appears abnormal.   7. Aortic valve stenosis is not present.   8. There is moderate aortic valve cusp calcification.       TRICUSPID VALVE/RVSP:             "                  Normal Ranges:  Peak TR Velocity: 3.68 m/s  Est. RA Pressure: 3 mmHg  RV Syst Pressure: 57.2 mmHg (< 30mmHg)      Echo [facility] at [facility] 4/29/2024 -   Left ventricle is normal in structure and function ejection fraction of 55 to 60% he has mild valvular aortic stenosis       Labwork   Complete Blood Count  Lab Results   Component Value Date    WBC 4.7 05/31/2024    HGB 11.2 (L) 05/31/2024    HCT 33.6 (L) 05/31/2024    MCV 97 05/31/2024     05/31/2024       Peripheral Eosinophil Count/Percentage:   No results found for: \"EOSABS\", \"EOSPCT\"    Serum Immunoglobulin E:    No results found for: \"IGE\"       ASSESSMENT/PLAN     Mr. Chew is a 69 y.o. male and  has a past medical history of Colon polyps (12/23/2019), Disorder of carotid artery (CMS-HCC) (09/14/2023), Foot ulcer (Multi), and Hyponatremia (08/30/2019). He presents to the [facility] for follow up of SHANTAL and dyspnea and concern for PHTN. PFTs completed but not reliable as patient unable to follow and perform forced volume capacity. Will proceed with Echo, repeat PSG and CT chest to r/o ILD    Problem List and Orders      Assessment and Plan / Recommendations:      Pulmonary hypertension:  This is based on most recent echocardiogram that revealed eRVSP of 57 mmHg.  RV is noted to be normal in size and function which is reassuring.  Resting room air O2 saturation is within normal range.  The patient has chronic dyspnea on exertion that is likely multifactorial in the obesity, deconditioning and chronic A-fib.  Lungs were clear on auscultation today.  No known history of chronic parenchymal lung disease.  We discussed today that pulmonary hypertension could be seen in the setting of SHANTAL.   He Is seeing his cardiologist -      Plan for now:  -- Repeat PSG with titration study scheduled 8/7/2024  -- PFTs - moderate restrictive - difficult to perform unclear true volumes as was not able to " perform FVC  -- CXR PA/Lateral with interstitial prominence and small pleural effusions- Ct chest negative No acute cardiopulmonary process.  -- Repeat Echocardiogram prior to next visit (6 months from initial study). - completed per patient at [facility] will obtain records - Sees cardiologist in followup in Sept reviewed mild aortic stenosis     2.   Obstructive sleep apnea:  -- To repeat PSG w/ titration - not completed yet  -- Continue on CPAP for now until repeated study is done.      3.   Obesity     4.  Other co-morbidities: HTN, DM, Afib.     Thank you for visiting the Pulmonary clinic today!   Tests: will need an ultrasound of the heart called echocardiogram to check on the pumping of the heart and schedule a sleep study - will need to obtain echo       Return to clinic after _8____weeks and after  CT scan complete echo and sleep styudy   or sooner if needed   Ana Fabian CNP  My office number is [phone] -     Best way to get a hold of me is to call my office --> Please do not send me follow my health messages  Any test results will be discussed at next visit -- please make sure to make a follow up appt after testing.    Patient's visit was converted to a virtual visit. Spoke with the patient via phone for 18 minutes.    Prep: 10 minutes  Patient had technical issues could not do virtual visit and converted to phone  Phone: 18minutes  Total: 28 minutes

## 2024-07-25 ENCOUNTER — APPOINTMENT (OUTPATIENT)
Dept: PULMONOLOGY | Facility: CLINIC | Age: 69
End: 2024-07-25
Payer: MEDICARE

## 2024-07-25 VITALS — WEIGHT: 260 LBS | HEIGHT: 70 IN | BODY MASS INDEX: 37.22 KG/M2

## 2024-07-25 DIAGNOSIS — G47.33 OSA ON CPAP: ICD-10-CM

## 2024-07-25 DIAGNOSIS — I27.20 PULMONARY HYPERTENSION (MULTI): Primary | ICD-10-CM

## 2024-07-25 DIAGNOSIS — E66.01 CLASS 2 SEVERE OBESITY WITH SERIOUS COMORBIDITY AND BODY MASS INDEX (BMI) OF 39.0 TO 39.9 IN ADULT, UNSPECIFIED OBESITY TYPE (MULTI): ICD-10-CM

## 2024-07-25 PROCEDURE — 1159F MED LIST DOCD IN RCRD: CPT | Performed by: NURSE PRACTITIONER

## 2024-07-25 PROCEDURE — 4010F ACE/ARB THERAPY RXD/TAKEN: CPT | Performed by: NURSE PRACTITIONER

## 2024-07-25 PROCEDURE — 3008F BODY MASS INDEX DOCD: CPT | Performed by: NURSE PRACTITIONER

## 2024-07-25 PROCEDURE — 99213 OFFICE O/P EST LOW 20 MIN: CPT | Performed by: NURSE PRACTITIONER

## 2024-07-25 PROCEDURE — 3044F HG A1C LEVEL LT 7.0%: CPT | Performed by: NURSE PRACTITIONER

## 2024-07-25 PROCEDURE — 3048F LDL-C <100 MG/DL: CPT | Performed by: NURSE PRACTITIONER

## 2024-07-25 PROCEDURE — 1126F AMNT PAIN NOTED NONE PRSNT: CPT | Performed by: NURSE PRACTITIONER

## 2024-07-25 PROCEDURE — 1036F TOBACCO NON-USER: CPT | Performed by: NURSE PRACTITIONER

## 2024-07-25 ASSESSMENT — PAIN SCALES - GENERAL: PAINLEVEL: 0-NO PAIN

## 2024-07-25 ASSESSMENT — ENCOUNTER SYMPTOMS
OCCASIONAL FEELINGS OF UNSTEADINESS: 1
LOSS OF SENSATION IN FEET: 0
DEPRESSION: 0

## 2024-08-07 ENCOUNTER — APPOINTMENT (OUTPATIENT)
Dept: SLEEP MEDICINE | Facility: CLINIC | Age: 69
End: 2024-08-07
Payer: MEDICARE

## 2024-09-03 ENCOUNTER — APPOINTMENT (OUTPATIENT)
Dept: SLEEP MEDICINE | Facility: CLINIC | Age: 69
End: 2024-09-03
Payer: MEDICARE

## 2024-09-13 ENCOUNTER — APPOINTMENT (OUTPATIENT)
Dept: CARDIOLOGY | Facility: CLINIC | Age: 69
End: 2024-09-13
Payer: MEDICARE

## 2024-09-13 VITALS
WEIGHT: 261 LBS | HEART RATE: 78 BPM | BODY MASS INDEX: 38.66 KG/M2 | HEIGHT: 69 IN | TEMPERATURE: 97.7 F | DIASTOLIC BLOOD PRESSURE: 72 MMHG | SYSTOLIC BLOOD PRESSURE: 114 MMHG

## 2024-09-13 DIAGNOSIS — I48.21 PERMANENT ATRIAL FIBRILLATION (MULTI): Primary | ICD-10-CM

## 2024-09-13 DIAGNOSIS — E78.2 MIXED HYPERLIPIDEMIA: ICD-10-CM

## 2024-09-13 DIAGNOSIS — I10 PRIMARY HYPERTENSION: ICD-10-CM

## 2024-09-13 PROCEDURE — 99213 OFFICE O/P EST LOW 20 MIN: CPT | Performed by: INTERNAL MEDICINE

## 2024-09-13 PROCEDURE — 3008F BODY MASS INDEX DOCD: CPT | Performed by: INTERNAL MEDICINE

## 2024-09-13 PROCEDURE — 1036F TOBACCO NON-USER: CPT | Performed by: INTERNAL MEDICINE

## 2024-09-13 PROCEDURE — 4010F ACE/ARB THERAPY RXD/TAKEN: CPT | Performed by: INTERNAL MEDICINE

## 2024-09-13 PROCEDURE — 3078F DIAST BP <80 MM HG: CPT | Performed by: INTERNAL MEDICINE

## 2024-09-13 PROCEDURE — 1126F AMNT PAIN NOTED NONE PRSNT: CPT | Performed by: INTERNAL MEDICINE

## 2024-09-13 PROCEDURE — 3048F LDL-C <100 MG/DL: CPT | Performed by: INTERNAL MEDICINE

## 2024-09-13 PROCEDURE — 3044F HG A1C LEVEL LT 7.0%: CPT | Performed by: INTERNAL MEDICINE

## 2024-09-13 PROCEDURE — 3074F SYST BP LT 130 MM HG: CPT | Performed by: INTERNAL MEDICINE

## 2024-09-13 RX ORDER — LATANOPROST 50 UG/ML
1 SOLUTION/ DROPS OPHTHALMIC NIGHTLY
COMMUNITY

## 2024-09-13 ASSESSMENT — ENCOUNTER SYMPTOMS
JOINT SWELLING: 1
ACTIVITY CHANGE: 1

## 2024-09-13 ASSESSMENT — PAIN SCALES - GENERAL: PAINLEVEL: 0-NO PAIN

## 2024-09-13 NOTE — PROGRESS NOTES
1.  Atrial fibrillation persistent CHADSVASc 4  2.  Hypertension  3.  Diabetes mellitus  4.  Hyperlipidemia  5.  Obesity  6.  Alcoholic cirrhosis    Patient is a pleasant 69-year-old male with the above-noted pertinent past medical history who presents today for follow-up, he usually walks with the help of his walker, he states that he has not started a walking program around his building that day he estimates about a quarter of a mile, he is able to do this with no difficulty with chest pains or shortness of breath, he continues to have some difficulty with his weight loss in fact he has gained since the last visit, he is compliant his medication and diet.    BMI 38.5 previously 37.5, heart rate of 78 bpm and blood pressure of 114/72 mmHg patient is a well-developed well-nourished male in no acute distress speaking full sentences no carotid bruits heart rate and rhythm are irregular, S1 variable S2 is normal no gallop or murmurs lungs decreased breath sound but clear abdomen is soft obese positive bowel sounds soft and nontender    May 2024  Total cholesterol 131, triglyceride 126, HDL 36, and LDL of 70  Sodium 136, potassium 3.8, BUN 17, creatinine of 0.79 with blood glucose level of 119 normal liver transaminases  Hemoglobin of 11.2 hematocrit of 33.6    Atrial fibrillation [id number] score of 4 on Eliquis and metoprolol with no complaints of exertional chest pain shortness of breath or palpitation although his level of activity does remain limited and needs his walker for balance.  Hypertension under good control  Hyperlipidemia his latest cholesterol profile was reviewed and discussed with him  Overweight heart healthy diet and weight loss recommended  Return to cardiology in 6 months

## 2024-09-13 NOTE — PROGRESS NOTES
Patient: Ricardo Chew    [id number]  : 1955 -- AGE 69 y.o.    Provider: DAJUAN Wagner   Location [facility]   Service Date: 2024       Department of Medicine  Division of Pulmonary, Critical Care, and Sleep Medicine         [facility]  Follow Up Visit Note      HISTORY OF PRESENT ILLNESS     HISTORY OF PRESENT ILLNESS   Mr. Chew is a 69-year-old man with past medical history of hypertension, diabetes mellitus type 2, hyperlipidemia, obesity, obstructive sleep apnea on CPAP, chronic atrial fibrillation and history of alcoholic cirrhosis who presented to the office today for evaluation of possible pulmonary hypertension based on most recent echocardiogram.    DATE OF LAST VISIT: 2024    Current History 2024    On today's visit, the patient reports he was seen by his cardiologist last week and he is retiring he is re-establishing care - he was not recommended to have a repeat echo.   Denies shortness of breath at rest.  He walks about the building once a day about 1/4 mile, then feels tired after this.  Denies coughing, wheezing, fever/chills or chest pain or heartburn.   Using his rollator to walk around  Denies chest pain  C/o occ throat clearing.   On CPAP at night - wearing about 8 hrs a night.  He has sleep study ordered 10/8.    He uses [facility].  He feels well rested.          Current History 2024     On today's visit, the patient reports no breathing issues since last issue. He is having left leg issues and going to see podiatrist  and going to wound care.  He has a wound that is draiing ad is wearing wraps due to lymphedema    Denies cough, wheezing, Fevers/chills, SOB at rest denies chest pain   He is walking around his building 1-2 times a day which a 1/4 mile each time, he stops a couple minutes  Denies runny nose or PND or allergies  Denies GERD  Using CPAP - has a sleep study scheduled 24  He reports having  an echo [facility] but unknown results       Current History 4/15/2024    On today's visit, the patient reports no ER visits. Denies cough or wheezing or chest pain or fever or chills.   He walks with a rollator, he was not SKAG[place] walking from [place] to office.   He lives in assistive living.     Allergies - denies runny nose or tingling in his throat  GERD denies   Has SHANTAL - using CPAP every night for 8 hrs. Has supplies - possible health care solutions. - needs repeat study reports next available is June Expand All Collapse All     Department of Medicine  Division of Pulmonary, Critical Care, and Sleep Medicine  Consultation  [facility] - [address], [address]     I was asked by Patricia Bingham, KALIN*, to evaluate Mr. Ricardo Chew for possible pulmonary hypertension. I have independently interviewed and examined the patient in the office and reviewed available records.     Physician HPI (2/20/2024):  Mr. Chew is a 69-year-old man with past medical history of hypertension, diabetes mellitus type 2, hyperlipidemia, obesity, obstructive sleep apnea on CPAP, chronic atrial fibrillation and history of alcoholic cirrhosis who presented to the office today for evaluation of possible pulmonary hypertension based on most recent echocardiogram.     Echocardiogram done in November 2023 revealed:  LV hypertrophy with EF 60-65%  Mild to moderate tricuspid regurgitation  RV normal in size and function  eRVSP 57 mmHg     The patient reports chronic dyspnea on exertion that has not changed in the past 6 months.  No acute respiratory symptoms today such as persistent cough, wheezing or exertional chest pain.  He reports he has been compliant with CPAP every night.  His most recent sleep study was done more than 10 years ago.  He recently moved to [place] from [place] and thus previous medical records were not available for me to review today.  He is a lifetime non-smoker with no past history of pulmonary disease or  recurrent sinopulmonary infections.  Has chronic lower extremity edema (about 1+).  Denies alcohol abuse now.              Electronically signed by Jhon Desouza MD at 2/20/2024  9:14 A           REVIEW OF SYSTEMS     REVIEW OF SYSTEMS  Review of Systems   Constitutional:  Positive for activity change.   Musculoskeletal:  Positive for joint swelling.        Bilateral elbows swollen   All other systems reviewed and are negative.        ALLERGIES AND MEDICATIONS     ALLERGIES  Allergies   Allergen Reactions    Ketorolac Other     Uncontrolled bloody nose       MEDICATIONS  Current Outpatient Medications   Medication Sig Dispense Refill    allopurinol (Zyloprim) 100 mg tablet TAKE 1 TABLET DAILY 90 tablet 1    amLODIPine (Norvasc) 10 mg tablet Take 1 tablet (10 mg) by mouth once daily. 90 tablet 3    atorvastatin (Lipitor) 40 mg tablet Take 1 tablet (40 mg) by mouth once daily. TAKE 1 TABLET Before meals 90 tablet 3    cholecalciferol (Vitamin D-3) 50 MCG (2000 UT) tablet Take 1 tablet (2,000 Units) by mouth once daily.      colchicine 0.6 mg tablet Take 1 tablet (0.6 mg) by mouth once daily. Take 1 tablet daily 90 tablet 3    dapagliflozin propanediol (Farxiga) 10 mg Take 1 tablet (10 mg) by mouth once daily. 90 tablet 3    Eliquis 5 mg tablet TAKE 1 TABLET BY MOUTH TWICE A DAY 60 tablet 2    folic acid (Folvite) 800 mcg tablet Take 1 tablet (0.8 mg) by mouth once daily.      furosemide (Lasix) 20 mg tablet Take 1 tablet (20 mg) by mouth once daily. Take 1 tablet daily 90 tablet 3    glipizide-metformin (Metaglip) 5-500 mg tablet Take 1 tablet by mouth 2 times a day. 180 tablet 2    latanoprost (Xalatan) 0.005 % ophthalmic solution 1 drop once daily at bedtime.      lisinopril 40 mg tablet Take 1 tablet (40 mg) by mouth once daily. Take 1 tablet daily 90 tablet 3    metoprolol succinate XL (Toprol-XL) 200 mg 24 hr tablet Take 1 tablet (200 mg) by mouth once daily. Do not crush or chew. - Take 1 tablet daily 90  tablet 3    multivit-minerals/folic acid (CENTRUM ADULTS ORAL) Take by mouth.      multivitamin tablet Take 1 tablet by mouth once daily.      multivitamin with minerals (multivitamin-iron-folic acid) tablet 15 mL.      pantoprazole (ProtoNix) 40 mg EC tablet Take 1 tablet (40 mg) by mouth once daily. 90 tablet 2     No current facility-administered medications for this visit.         PAST HISTORY     PAST MEDICAL HISTORY  He  has a past medical history of Colon polyps (12/23/2019), Disorder of carotid artery (CMS-HCC) (09/14/2023), Foot ulcer (Multi), and Hyponatremia (08/30/2019).       PAST SURGICAL HISTORY  Past Surgical History:   Procedure Laterality Date    CATARACT EXTRACTION         IMMUNIZATION HISTORY  Immunization History   Administered Date(s) Administered    Flu vaccine (IIV4), preservative free *Check age/dose* 12/17/2015, 10/24/2016    Flu vaccine, quadrivalent, high-dose, preservative free, age 65y+ (FLUZONE) 09/20/2023    Influenza, Unspecified 10/06/2011, 10/01/2013, 10/01/2015, 10/25/2016, 11/20/2017, 10/30/2018, 11/05/2019    Influenza, seasonal, injectable 10/01/2022    Moderna COVID-19 vaccine, 12 years and older (50mcg/0.5mL)(Spikevax) 10/11/2023    Pfizer Purple Cap SARS-CoV-2 04/02/2021, 04/30/2021, 12/16/2021, 04/12/2022, 10/26/2022    Pneumococcal conjugate vaccine, 20-valent (PREVNAR 20) 08/24/2022    Pneumococcal polysaccharide vaccine, 23-valent, age 2 years and older (PNEUMOVAX 23) 04/19/2011    Tdap vaccine, age 7 year and older (BOOSTRIX, ADACEL) 02/11/2016    Zoster vaccine, recombinant, adult (SHINGRIX) 10/30/2018, 01/12/2021, 01/12/2021, 03/15/2021, 03/15/2021    Zoster, live 06/23/2015, 10/29/2018, 10/30/2018, 01/12/2021, 03/15/2021       SOCIAL HISTORY  He  reports that he has never smoked. He has never been exposed to tobacco smoke. He has never used smokeless tobacco. He reports current alcohol use of about 4.0 standard drinks of alcohol per week. He reports that he does not  "use drugs. He Patient   Social History:  Social History            Tobacco Use    Smoking status: Former       Packs/day: 1       Types: Cigarettes       Quit date:        Years since quittin.1       Passive exposure: Never    Smokeless tobacco: Never   Substance Use Topics    Alcohol use: Yes       Comment: weekdays: 3 days per week 4-5 beers,  On weekend 5-6 beers while watching sports    Drug use: Never         Occupational & Environmental History:   Retired      FAMILY HISTORY  Family History   Problem Relation Name Age of Onset    Hypertension Father      Heart failure Father      Parkinsonism Father      Other (Cardiac Disorder) Brother      Other (Cardiac Pacemaker) Brother      Parkinsonism Brother           PHYSICAL EXAM     VITAL SIGNS: /76   Pulse 68   Temp 37.3 °C (99.1 °F) (Temporal)   Ht 1.753 m (5' 9\")   Wt 120 kg (265 lb)   SpO2 95%   BMI 39.13 kg/m²  /76   Pulse 68   Temp 37.3 °C (99.1 °F) (Temporal)   Ht 1.753 m (5' 9\")   Wt 120 kg (265 lb)   SpO2 95%   BMI 39.13 kg/m²      PREVIOUS WEIGHTS:  Wt Readings from Last 3 Encounters:   24 120 kg (265 lb)   24 118 kg (261 lb)   24 118 kg (260 lb)       Physical Exam  HENT:      Head: Normocephalic.      Nose: Nose normal.      Mouth/Throat:      Mouth: Mucous membranes are moist.      Pharynx: Oropharynx is clear.   Eyes:      Pupils: Pupils are equal, round, and reactive to light.   Cardiovascular:      Rate and Rhythm: Normal rate and regular rhythm.   Pulmonary:      Effort: Pulmonary effort is normal.      Comments: Poor inspiratory effort, diminished bases  Abdominal:      General: Bowel sounds are normal.      Palpations: Abdomen is soft.   Musculoskeletal:         General: Normal range of motion.      Cervical back: Normal range of motion.      Comments: Bilateral elbows swollen   Skin:     General: Skin is warm and dry.   Neurological:      General: No focal deficit present. "      Mental Status: He is alert and oriented to person, place, and time. Mental status is at baseline.   Psychiatric:         Mood and Affect: Mood normal.         Behavior: Behavior normal.         Thought Content: Thought content normal.         Judgment: Judgment normal.           RESULTS/DATA     Pulmonary Function Test Results        Pulmonary Function Test - Scan on 4/16/2024  2:07 PM: PFT's done on  3/27/24                   Chest Radiograph     XR chest 2 views 03/27/2024    Narrative  Interpreted By:  Kody Lopez,  STUDY:  XR CHEST 2 VIEWS;  3/27/2024 10:00 am    INDICATION:  Signs/Symptoms:dyspnea.    COMPARISON:  None.    FINDINGS:  PA and lateral radiographs of the chest were provided.    CARDIOMEDIASTINAL SILHOUETTE:  Cardiomediastinal silhouette is normal in size and configuration.    LUNGS:  Small right basilar pleural effusion and atelectasis. Trace left  basilar pleural effusion. Mild diffuse interstitial prominence. No  pneumothorax. No dense focal consolidation.    ABDOMEN:  No remarkable upper abdominal findings.    BONES:  No acute osseous changes.    Impression  1.  Small right and trace left basilar pleural effusions and  atelectasis.  2. Mild diffuse interstitial prominence. Correlate with volume status  and concern for interstitial edema.    Chest CT Scan     5/20/2024 CT chest   Note PROCEDURE: CT CHEST WITHOUT IV CONTRAST  HISTORY: WHAT SYMPTOMS ARE YOU EXPERIENCING?; ABNORMAL CXR I27.20, R93.89  COMPARISON: Chest x-ray, 4/29/2024  TECHNIQUE: Multiple contiguous axial CT images of the chest were obtained without the administration of intravenous contrast. Sagittal and coronal reconstructions were performed. All CT scans at this facility use dose modulation, iterative reconstruction, and/or weight based dosing when appropriate to reduce radiation dose to as low as reasonably achievable.  FINDINGS:  Mediastinum: Atherosclerotic aortic and coronary arterial calcifications noted. The heart  is mildly enlarged. The heart and great vessels are otherwise unremarkable. There is no pericardial effusion. No evidence of mediastinal lymphadenopathy.  Lungs: The lung parenchyma appears unremarkable. No evidence of focal consolidation. No soft tissue nodules are seen. No pleural effusion or pneumothorax identified.  Upper Abdomen: The visualized upper abdominal contents reveal no acute process.  Bones and Soft Tissues: No acute osseous abnormalities are identified. The soft tissues are unremarkable.  IMPRESSION:  No acute cardiopulmonary process.  Mild cardiomegaly and atherosclerotic vascular calcifications incidentally noted.           Echocardiogram     TRANSTHORACIC ECHOCARDIOGRAM REPORT 11/7/2023     Patient Name:      LOGAN COHEN        Reading Physician:    [id number] Garner MD  Study Date:        11/7/2023            Ordering Provider:    Adalberto GARNER     PHYSICIAN INTERPRETATION:  Left Ventricle: Left ventricular systolic function is normal. The left ventricular cavity size is normal. There is moderate concentric left ventricular hypertrophy. Left ventricular diastolic filling was indeterminate. LVEF 60+/-5%.  Left Atrium: The left atrium is upper limits of normal in size.  Right Ventricle: The right ventricle is normal in size. There is normal right ventricular global systolic function.  Right Atrium: The right atrium is normal in size.  Aortic Valve: The aortic valve appears abnormal. There is moderate aortic valve cusp calcification. There is no evidence of aortic valve stenosis.  There is no evidence of aortic valve regurgitation. The peak instantaneous gradient of the aortic valve is 20.8 mmHg. The mean gradient of the aortic valve is 11.0 mmHg.  Mitral Valve: The mitral valve is normal in structure. There is moderate mitral annular calcification. There is  "mild mitral valve regurgitation.  Tricuspid Valve: The tricuspid valve is structurally normal. There is mild to moderate tricuspid regurgitation.  Pulmonic Valve: The pulmonic valve is not well visualized. The pulmonic valve regurgitation was not well visualized.  Pericardium: There is no pericardial effusion noted.  Aorta: The aortic root is normal.        CONCLUSIONS:   1. Left ventricular systolic function is normal.   2. LVEF 60+/-5%.   3. There is moderate concentric left ventricular hypertrophy.   4. There is moderate mitral annular calcification.   5. Mild to moderate tricuspid regurgitation.   6. Aortic valve appears abnormal.   7. Aortic valve stenosis is not present.   8. There is moderate aortic valve cusp calcification.       TRICUSPID VALVE/RVSP:                              Normal Ranges:  Peak TR Velocity: 3.68 m/s  Est. RA Pressure: 3 mmHg  RV Syst Pressure: 57.2 mmHg (< 30mmHg)      Echo completed at [facility] 4/29/2024 -   Left ventricle is normal in structure and function ejection fraction of 55 to 60% he has mild valvular aortic stenosis       Labwork   Complete Blood Count  Lab Results   Component Value Date    WBC 4.7 05/31/2024    HGB 11.2 (L) 05/31/2024    HCT 33.6 (L) 05/31/2024    MCV 97 05/31/2024     05/31/2024       Peripheral Eosinophil Count/Percentage:   No results found for: \"EOSABS\", \"EOSPCT\"    Serum Immunoglobulin E:    No results found for: \"IGE\"       ASSESSMENT/PLAN     Mr. Chew is a 69 y.o. male and  has a past medical history of Colon polyps (12/23/2019), Disorder of carotid artery (CMS-HCC) (09/14/2023), Foot ulcer (Multi), and Hyponatremia (08/30/2019). He presents to the [facility] for follow up of SHANTAL and dyspnea and concern for PHTN. PFTs completed but not reliable as patient unable to follow and perform forced volume capacity. Will proceed with Echo, repeat PSG and CT chest to r/o ILD    Problem List and Orders      Assessment " and Plan / Recommendations:      Pulmonary hypertension:  This is based on most recent echocardiogram that revealed eRVSP of 57 mmHg.  RV is noted to be normal in size and function which is reassuring.  Resting room air O2 saturation is within normal range.  The patient has chronic dyspnea on exertion that is likely multifactorial in the obesity, deconditioning and chronic A-fib.  Lungs were clear on auscultation today.  No known history of chronic parenchymal lung disease.  We discussed today that pulmonary hypertension could be seen in the setting of SHANTAL.   He Is seeing his cardiologist -  - seen 9/13/2024 - mild aortic stenosis - did not recommend repeat echo      Plan for now:  -- Repeat PSG with titration study scheduled 8/7/2024  -- PFTs - moderate restrictive - difficult to perform unclear true volumes as was not able to perform FVC  -- CXR PA/Lateral with interstitial prominence and small pleural effusions- Ct chest negative No acute cardiopulmonary process.  -- Repeat Echocardiogram prior to next visit (6 months from initial study). - completed per patient at [facility] will obtain records - Sees cardiologist in followup in Sept reviewed mild aortic stenosis - did not recommend repeat echo      2.   Obstructive sleep apnea:  -- To repeat PSG w/ titration - not completed yet  -- Continue on CPAP for now until repeated study is done - scheduled 10/8th      3.   Obesity     4.  Other co-morbidities: HTN, DM, Afib.     Recommend flu shot, RSV and COVID booster    Thank you for visiting the Pulmonary clinic today!   Tests: schedule a sleep study       Return to clinic after _8____weeks and sleep styudy   or sooner if needed   Ana Fabian CNP  My office number is [phone] -     Best way to get a hold of me is to call my office --> Please do not send me follow my health messages  Any test results will be discussed at next visit -- please make sure to make a follow up appt after testing.

## 2024-09-19 ENCOUNTER — APPOINTMENT (OUTPATIENT)
Dept: PULMONOLOGY | Facility: CLINIC | Age: 69
End: 2024-09-19
Payer: MEDICARE

## 2024-09-19 VITALS
SYSTOLIC BLOOD PRESSURE: 158 MMHG | OXYGEN SATURATION: 95 % | BODY MASS INDEX: 39.25 KG/M2 | HEART RATE: 68 BPM | WEIGHT: 265 LBS | DIASTOLIC BLOOD PRESSURE: 76 MMHG | HEIGHT: 69 IN | TEMPERATURE: 99.1 F

## 2024-09-19 DIAGNOSIS — I27.20 PULMONARY HYPERTENSION (MULTI): Primary | ICD-10-CM

## 2024-09-19 DIAGNOSIS — E66.01 CLASS 2 SEVERE OBESITY WITH SERIOUS COMORBIDITY AND BODY MASS INDEX (BMI) OF 39.0 TO 39.9 IN ADULT, UNSPECIFIED OBESITY TYPE: ICD-10-CM

## 2024-09-19 DIAGNOSIS — G47.33 OSA ON CPAP: ICD-10-CM

## 2024-09-19 PROCEDURE — 99214 OFFICE O/P EST MOD 30 MIN: CPT | Performed by: NURSE PRACTITIONER

## 2024-09-19 PROCEDURE — 1159F MED LIST DOCD IN RCRD: CPT | Performed by: NURSE PRACTITIONER

## 2024-09-19 PROCEDURE — 1126F AMNT PAIN NOTED NONE PRSNT: CPT | Performed by: NURSE PRACTITIONER

## 2024-09-19 PROCEDURE — 4010F ACE/ARB THERAPY RXD/TAKEN: CPT | Performed by: NURSE PRACTITIONER

## 2024-09-19 PROCEDURE — 3078F DIAST BP <80 MM HG: CPT | Performed by: NURSE PRACTITIONER

## 2024-09-19 PROCEDURE — 3048F LDL-C <100 MG/DL: CPT | Performed by: NURSE PRACTITIONER

## 2024-09-19 PROCEDURE — 1036F TOBACCO NON-USER: CPT | Performed by: NURSE PRACTITIONER

## 2024-09-19 PROCEDURE — 3044F HG A1C LEVEL LT 7.0%: CPT | Performed by: NURSE PRACTITIONER

## 2024-09-19 PROCEDURE — 3077F SYST BP >= 140 MM HG: CPT | Performed by: NURSE PRACTITIONER

## 2024-09-19 PROCEDURE — 3008F BODY MASS INDEX DOCD: CPT | Performed by: NURSE PRACTITIONER

## 2024-09-19 ASSESSMENT — ENCOUNTER SYMPTOMS
LOSS OF SENSATION IN FEET: 0
DEPRESSION: 0
OCCASIONAL FEELINGS OF UNSTEADINESS: 1

## 2024-09-19 ASSESSMENT — PAIN SCALES - GENERAL: PAINLEVEL: 0-NO PAIN

## 2024-09-19 NOTE — PATIENT INSTRUCTIONS
This is based on most recent echocardiogram that revealed eRVSP of 57 mmHg.  RV is noted to be normal in size and function which is reassuring.  Resting room air O2 saturation is within normal range.  The patient has chronic dyspnea on exertion that is likely multifactorial in the obesity, deconditioning and chronic A-fib.  Lungs were clear on auscultation today.  No known history of chronic parenchymal lung disease.  We discussed today that pulmonary hypertension could be seen in the setting of SHANTAL.   He Is seeing his cardiologist -  - seen 9/13/2024 - mild aortic stenosis - did not recommend repeat echo      Plan for now:  -- Repeat PSG with titration study scheduled 8/7/2024  -- PFTs - moderate restrictive - difficult to perform unclear true volumes as was not able to perform FVC  -- CXR PA/Lateral with interstitial prominence and small pleural effusions- Ct chest negative No acute cardiopulmonary process.  -- Repeat Echocardiogram prior to next visit (6 months from initial study). - completed per patient at [facility] will obtain records - Sees cardiologist in followup in Sept reviewed mild aortic stenosis - did not recommend repeat echo      2.   Obstructive sleep apnea:  -- To repeat PSG w/ titration - not completed yet  -- Continue on CPAP for now until repeated study is done - scheduled 10/8th      3.   Obesity     4.  Other co-morbidities: HTN, DM, Afib.     Recommend flu shot, RSV and COVID booster    Thank you for visiting the Pulmonary clinic today!   Tests: schedule a sleep study       Return to clinic after _8____weeks and sleep styudy   or sooner if needed   Ana Fabian CNP  My office number is [phone] -     Best way to get a hold of me is to call my office --> Please do not send me follow my health messages  Any test results will be discussed at next visit -- please make sure to make a follow up appt after testing.

## 2024-09-24 ENCOUNTER — APPOINTMENT (OUTPATIENT)
Dept: CARDIOLOGY | Facility: CLINIC | Age: 69
End: 2024-09-24
Payer: MEDICARE

## 2024-09-29 DIAGNOSIS — I48.20 CHRONIC ATRIAL FIBRILLATION (MULTI): ICD-10-CM

## 2024-10-06 DIAGNOSIS — G47.33 OBSTRUCTIVE SLEEP APNEA (ADULT) (PEDIATRIC): ICD-10-CM

## 2024-10-06 DIAGNOSIS — Z99.89 DEPENDENCE ON OTHER ENABLING MACHINES AND DEVICES: ICD-10-CM

## 2024-10-07 ENCOUNTER — TELEPHONE (OUTPATIENT)
Dept: PRIMARY CARE | Facility: CLINIC | Age: 69
End: 2024-10-07
Payer: MEDICARE

## 2024-10-07 DIAGNOSIS — G47.33 OBSTRUCTIVE SLEEP APNEA (ADULT) (PEDIATRIC): ICD-10-CM

## 2024-10-07 DIAGNOSIS — Z99.89 DEPENDENCE ON OTHER ENABLING MACHINES AND DEVICES: ICD-10-CM

## 2024-10-07 RX ORDER — ALLOPURINOL 100 MG/1
100 TABLET ORAL DAILY
Qty: 90 TABLET | Refills: 0 | Status: SHIPPED | OUTPATIENT
Start: 2024-10-07

## 2024-10-07 RX ORDER — ALLOPURINOL 100 MG/1
100 TABLET ORAL DAILY
Qty: 90 TABLET | Refills: 1 | OUTPATIENT
Start: 2024-10-07

## 2024-10-07 NOTE — TELEPHONE ENCOUNTER
Patricia patient needs refill on allopurinol 100mg (is calling Dr. Dooley today to try and get an appointment). He thought he still had refills but the pharmacy said he did not.       [facility]/pharmacy #[id number] - [place], [place] - [facility] OF [place] [address] [address]  [facility] [id number]  Phone: [phone] Fax: [phone]

## 2024-10-08 ENCOUNTER — APPOINTMENT (OUTPATIENT)
Dept: SLEEP MEDICINE | Facility: CLINIC | Age: 69
End: 2024-10-08
Payer: MEDICARE

## 2024-11-06 DIAGNOSIS — E11.51 DIABETES MELLITUS WITH PERIPHERAL ARTERY DISEASE: ICD-10-CM

## 2024-11-06 DIAGNOSIS — R80.9 ALBUMINURIA: ICD-10-CM

## 2024-11-06 DIAGNOSIS — I10 ESSENTIAL HYPERTENSION: ICD-10-CM

## 2024-11-06 DIAGNOSIS — E55.9 VITAMIN D DEFICIENCY: ICD-10-CM

## 2024-11-06 DIAGNOSIS — E08.22 DIABETES MELLITUS DUE TO UNDERLYING CONDITION WITH DIABETIC CHRONIC KIDNEY DISEASE, UNSPECIFIED CKD STAGE, UNSPECIFIED WHETHER LONG TERM INSULIN USE: ICD-10-CM

## 2024-11-08 ENCOUNTER — TELEPHONE (OUTPATIENT)
Dept: NEPHROLOGY | Facility: CLINIC | Age: 69
End: 2024-11-08
Payer: MEDICARE

## 2024-11-11 DIAGNOSIS — R80.9 ALBUMINURIA: Primary | ICD-10-CM

## 2024-11-11 RX ORDER — DAPAGLIFLOZIN 10 MG/1
10 TABLET, FILM COATED ORAL DAILY
Qty: 90 TABLET | Refills: 0 | Status: SHIPPED | OUTPATIENT
Start: 2024-11-11

## 2024-11-25 ENCOUNTER — APPOINTMENT (OUTPATIENT)
Dept: SLEEP MEDICINE | Facility: CLINIC | Age: 69
End: 2024-11-25
Payer: MEDICARE

## 2024-12-06 ENCOUNTER — APPOINTMENT (OUTPATIENT)
Dept: PRIMARY CARE | Facility: CLINIC | Age: 69
End: 2024-12-06
Payer: MEDICARE

## 2024-12-19 ENCOUNTER — APPOINTMENT (OUTPATIENT)
Dept: PULMONOLOGY | Facility: CLINIC | Age: 69
End: 2024-12-19
Payer: MEDICARE

## 2024-12-30 ENCOUNTER — TELEPHONE (OUTPATIENT)
Facility: CLINIC | Age: 69
End: 2024-12-30

## 2024-12-30 ENCOUNTER — APPOINTMENT (OUTPATIENT)
Dept: PULMONOLOGY | Facility: CLINIC | Age: 69
End: 2024-12-30
Payer: MEDICARE

## 2024-12-30 NOTE — TELEPHONE ENCOUNTER
LMOM for patient to call the office to R/S 12/30 OV with Ana Fabian CNP.    Provider is out sick today.    Office number given on VM.

## 2025-01-08 ENCOUNTER — APPOINTMENT (OUTPATIENT)
Dept: NEPHROLOGY | Facility: CLINIC | Age: 70
End: 2025-01-08
Payer: MEDICARE

## 2025-01-08 VITALS
DIASTOLIC BLOOD PRESSURE: 90 MMHG | WEIGHT: 260.2 LBS | SYSTOLIC BLOOD PRESSURE: 170 MMHG | BODY MASS INDEX: 38.54 KG/M2 | HEART RATE: 78 BPM | HEIGHT: 69 IN

## 2025-01-08 DIAGNOSIS — E08.22 DIABETES MELLITUS DUE TO UNDERLYING CONDITION WITH DIABETIC CHRONIC KIDNEY DISEASE, UNSPECIFIED CKD STAGE, UNSPECIFIED WHETHER LONG TERM INSULIN USE: ICD-10-CM

## 2025-01-08 DIAGNOSIS — I10 ESSENTIAL HYPERTENSION: ICD-10-CM

## 2025-01-08 DIAGNOSIS — R80.9 ALBUMINURIA: Primary | ICD-10-CM

## 2025-01-08 PROCEDURE — 3077F SYST BP >= 140 MM HG: CPT | Performed by: INTERNAL MEDICINE

## 2025-01-08 PROCEDURE — 1159F MED LIST DOCD IN RCRD: CPT | Performed by: INTERNAL MEDICINE

## 2025-01-08 PROCEDURE — 1036F TOBACCO NON-USER: CPT | Performed by: INTERNAL MEDICINE

## 2025-01-08 PROCEDURE — 3008F BODY MASS INDEX DOCD: CPT | Performed by: INTERNAL MEDICINE

## 2025-01-08 PROCEDURE — 4010F ACE/ARB THERAPY RXD/TAKEN: CPT | Performed by: INTERNAL MEDICINE

## 2025-01-08 PROCEDURE — 99214 OFFICE O/P EST MOD 30 MIN: CPT | Performed by: INTERNAL MEDICINE

## 2025-01-08 PROCEDURE — 3080F DIAST BP >= 90 MM HG: CPT | Performed by: INTERNAL MEDICINE

## 2025-01-08 RX ORDER — CHLORTHALIDONE 25 MG/1
25 TABLET ORAL DAILY
Qty: 90 TABLET | Refills: 3 | Status: SHIPPED | OUTPATIENT
Start: 2025-01-08 | End: 2026-01-08

## 2025-01-08 NOTE — PROGRESS NOTES
Ricardo Chew   69 y.o.    @@  N/Room: [id number]/Room/bed info not found    Subjective:   The patient is being seen for a routine clinic follow-up of chronic kidney disease. Recently, the disease has been stable. Disease complications:  No hyperkalemia, no hypocalcemia, no hyperphosphatemia, no metabolic acidosis, no coagulopathy, no uremic encephalopathy, no neuropathy and no renal osteodystrophy. The patient is currently asymptomatic. No associated symptoms are reported.       Meds:   Current Outpatient Medications   Medication Sig Dispense Refill    allopurinol (Zyloprim) 100 mg tablet Take 1 tablet (100 mg) by mouth once daily. 90 tablet 0    amLODIPine (Norvasc) 10 mg tablet Take 1 tablet (10 mg) by mouth once daily. 90 tablet 3    apixaban (Eliquis) 5 mg tablet Take 1 tablet (5 mg) by mouth 2 times a day. 180 tablet 1    atorvastatin (Lipitor) 40 mg tablet Take 1 tablet (40 mg) by mouth once daily. TAKE 1 TABLET Before meals 90 tablet 3    cholecalciferol (Vitamin D-3) 50 MCG (2000 UT) tablet Take 1 tablet (2,000 Units) by mouth once daily.      colchicine 0.6 mg tablet Take 1 tablet (0.6 mg) by mouth once daily. Take 1 tablet daily 90 tablet 3    Farxiga 10 mg TAKE 1 TABLET BY MOUTH EVERY DAY 90 tablet 0    folic acid (Folvite) 800 mcg tablet Take 1 tablet (0.8 mg) by mouth once daily.      furosemide (Lasix) 20 mg tablet Take 1 tablet (20 mg) by mouth once daily. Take 1 tablet daily 90 tablet 3    glipizide-metformin (Metaglip) 5-500 mg tablet Take 1 tablet by mouth 2 times a day. 180 tablet 2    latanoprost (Xalatan) 0.005 % ophthalmic solution 1 drop once daily at bedtime.      lisinopril 40 mg tablet Take 1 tablet (40 mg) by mouth once daily. Take 1 tablet daily 90 tablet 3    metoprolol succinate XL (Toprol-XL) 200 mg 24 hr tablet Take 1 tablet (200 mg) by mouth once daily. Do not crush or chew. - Take 1 tablet daily 90 tablet 3    multivit-minerals/folic acid (CENTRUM ADULTS ORAL) Take by mouth.       "pantoprazole (ProtoNix) 40 mg EC tablet Take 1 tablet (40 mg) by mouth once daily. 90 tablet 2    multivitamin tablet Take 1 tablet by mouth once daily. (Patient not taking: Reported on 1/8/2025)      multivitamin with minerals (multivitamin-iron-folic acid) tablet 15 mL. (Patient not taking: Reported on 1/8/2025)       No current facility-administered medications for this visit.          ROS:  The patient is awake and oriented. No dizziness or lightheadedness. No chills and no fever. No headaches. No nausea and no vomiting. No shortness of breath. No cough. No chest pain. No abdominal pain. No diarrhea. No hematemesis or hemoptysis. No hematuria. No rectal bleeding. No melena. No epistaxis. No urinary symptoms. No flank pain. + leg edema. No itching. Overall, the rest of the review of systems is also negative.  12 point review of systems otherwise negative as stated in HPI.        Physical Examination:        Vitals:    01/08/25 1028   BP: 170/90   Pulse: 78     General: The patient is awake, oriented, and is not in any distress.  Head and Neck: Normocephalic. No periorbital edema.  Eyes: non-icteric  Respiratory: Symmetric chest expansion. No respiratory distress.  Skin: No maculopapular rash.  Musculoskeletal: + peripheral edema.  Neuro Exam: Speech is fluent. Moves extremities.    Imaging:         Blood Labs:  No results found for this or any previous visit (from the past 24 hours).   No results found for: \"BMPR1A\", \"PTH\", \"PROTUR\", \"PHOS\"   Lab Results   Component Value Date    GLUCOSE 119 (H) 05/31/2024    CALCIUM 9.8 05/31/2024     05/31/2024    K 3.8 05/31/2024    CO2 25 05/31/2024    CL 98 05/31/2024    BUN 17 05/31/2024    CREATININE 0.79 05/31/2024         Assessment and Plan:    1 proteinuria.  The patient has long history of diabetes.  Last spot urine albumin to creatinine ratio shows more than 158 mg albuminuria.  This is most likely because of diabetic nephropathy.  He is on maximum dose of " lisinopril and Farxiga last creatinine level is 0.79.    2.  Hypertension.  Blood pressure is high.  I added chlorthalidone to his medications.     2.  Diabetes.     I will see him in about 4 months for follow-up.        Blaine Jackson MD  Senior Attending Physician  Director of Onco-Nephrology Program  Division of Nephrology & Hypertension  [facility]

## 2025-01-09 LAB
NON-UH HIE BUN/CREAT RATIO: 18.9
NON-UH HIE BUN: 17 MG/DL (ref 9–23)
NON-UH HIE CALCIUM: 10 MG/DL (ref 8.7–10.4)
NON-UH HIE CALCULATED OSMOLALITY: 268 MOSM/KG (ref 275–295)
NON-UH HIE CHLORIDE: 98 MMOL/L (ref 98–107)
NON-UH HIE CO2, VENOUS: 27 MMOL/L (ref 20–31)
NON-UH HIE CREATININE RANDOM, U: 15.7 MG/DL
NON-UH HIE CREATININE: 0.9 MG/DL (ref 0.6–1.1)
NON-UH HIE GFR AA: >60
NON-UH HIE GLOMERULAR FILTRATION RATE: >60 ML/MIN/1.73M?
NON-UH HIE GLUCOSE: 139 MG/DL (ref 74–106)
NON-UH HIE K: 3.9 MMOL/L (ref 3.5–5.1)
NON-UH HIE NA: 132 MMOL/L (ref 135–145)
NON-UH HIE TOTAL PROTEIN, RANDOM URINE: 35 MG/DL (ref 1–14)

## 2025-02-05 ENCOUNTER — TELEPHONE (OUTPATIENT)
Dept: NEPHROLOGY | Facility: CLINIC | Age: 70
End: 2025-02-05
Payer: MEDICARE

## 2025-02-05 NOTE — TELEPHONE ENCOUNTER
Patient has been seeing the wound center at [facility] due to leakage in his leg and some times calcium deposits are taken out. They think that the underlying issue is a calcinoses and would like him to talk to you about it.  His next appointment with you is 4/23/25. He is available to come in on Wednesdays if you would like to see him sooner.  He says he will have his records from [facility] faxed over to our office soon.

## 2025-02-06 DIAGNOSIS — I10 ESSENTIAL HYPERTENSION: ICD-10-CM

## 2025-02-06 DIAGNOSIS — E08.22 DIABETES MELLITUS DUE TO UNDERLYING CONDITION WITH DIABETIC CHRONIC KIDNEY DISEASE, UNSPECIFIED CKD STAGE, UNSPECIFIED WHETHER LONG TERM INSULIN USE: ICD-10-CM

## 2025-02-06 DIAGNOSIS — R80.9 ALBUMINURIA: ICD-10-CM

## 2025-02-06 DIAGNOSIS — E55.9 VITAMIN D DEFICIENCY: ICD-10-CM

## 2025-02-06 DIAGNOSIS — E11.51 DIABETES MELLITUS WITH PERIPHERAL ARTERY DISEASE: ICD-10-CM

## 2025-02-10 RX ORDER — DAPAGLIFLOZIN 10 MG/1
10 TABLET, FILM COATED ORAL DAILY
Qty: 90 TABLET | Refills: 3 | Status: SHIPPED | OUTPATIENT
Start: 2025-02-10

## 2025-02-26 ENCOUNTER — APPOINTMENT (OUTPATIENT)
Facility: CLINIC | Age: 70
End: 2025-02-26
Payer: MEDICARE

## 2025-03-20 ENCOUNTER — APPOINTMENT (OUTPATIENT)
Dept: CARDIOLOGY | Facility: CLINIC | Age: 70
End: 2025-03-20
Payer: MEDICARE

## 2025-03-29 DIAGNOSIS — I48.20 CHRONIC ATRIAL FIBRILLATION (MULTI): ICD-10-CM

## 2025-03-31 ENCOUNTER — APPOINTMENT (OUTPATIENT)
Facility: CLINIC | Age: 70
End: 2025-03-31
Payer: MEDICARE

## 2025-03-31 RX ORDER — APIXABAN 5 MG/1
5 TABLET, FILM COATED ORAL 2 TIMES DAILY
Qty: 180 TABLET | Refills: 1 | Status: SHIPPED | OUTPATIENT
Start: 2025-03-31

## 2025-04-17 ENCOUNTER — APPOINTMENT (OUTPATIENT)
Dept: CARDIOLOGY | Facility: CLINIC | Age: 70
End: 2025-04-17
Payer: MEDICARE

## 2025-04-17 VITALS
TEMPERATURE: 98 F | WEIGHT: 252.4 LBS | DIASTOLIC BLOOD PRESSURE: 68 MMHG | HEIGHT: 69 IN | BODY MASS INDEX: 37.38 KG/M2 | SYSTOLIC BLOOD PRESSURE: 138 MMHG | HEART RATE: 79 BPM

## 2025-04-17 DIAGNOSIS — I83.93 VARICOSE VEINS OF BOTH LOWER EXTREMITIES, UNSPECIFIED WHETHER COMPLICATED: ICD-10-CM

## 2025-04-17 DIAGNOSIS — I50.32 CHRONIC DIASTOLIC HEART FAILURE: ICD-10-CM

## 2025-04-17 DIAGNOSIS — I48.11 LONGSTANDING PERSISTENT ATRIAL FIBRILLATION (MULTI): Primary | ICD-10-CM

## 2025-04-17 DIAGNOSIS — E78.2 MIXED HYPERLIPIDEMIA: ICD-10-CM

## 2025-04-17 DIAGNOSIS — Z79.01 CHRONIC ANTICOAGULATION: ICD-10-CM

## 2025-04-17 DIAGNOSIS — I10 PRIMARY HYPERTENSION: ICD-10-CM

## 2025-04-17 DIAGNOSIS — I35.8 AORTIC VALVE SCLEROSIS: ICD-10-CM

## 2025-04-17 PROBLEM — I83.892 VENOUS STASIS ULCER OF LEFT LOWER LEG WITH EDEMA OF LEFT LOWER LEG: Status: ACTIVE | Noted: 2025-04-17

## 2025-04-17 PROBLEM — L94.2 CALCINOSIS CUTIS: Status: ACTIVE | Noted: 2025-04-17

## 2025-04-17 PROBLEM — E11.65 TYPE 2 DIABETES MELLITUS WITH HYPERGLYCEMIA (MULTI): Status: ACTIVE | Noted: 2025-04-17

## 2025-04-17 PROBLEM — L97.929 VENOUS STASIS ULCER OF LEFT LOWER LEG WITH EDEMA OF LEFT LOWER LEG: Status: ACTIVE | Noted: 2025-04-17

## 2025-04-17 PROBLEM — I83.029 VENOUS STASIS ULCER OF LEFT LOWER LEG WITH EDEMA OF LEFT LOWER LEG: Status: ACTIVE | Noted: 2025-04-17

## 2025-04-17 PROBLEM — R60.0 VENOUS STASIS ULCER OF LEFT LOWER LEG WITH EDEMA OF LEFT LOWER LEG: Status: ACTIVE | Noted: 2025-04-17

## 2025-04-17 RX ORDER — METOPROLOL SUCCINATE 100 MG/1
150 TABLET, EXTENDED RELEASE ORAL DAILY
Qty: 135 TABLET | Refills: 3 | Status: SHIPPED | OUTPATIENT
Start: 2025-04-17 | End: 2026-04-17

## 2025-04-17 RX ORDER — DOXYCYCLINE 100 MG/1
1 CAPSULE ORAL
COMMUNITY
Start: 2025-02-24

## 2025-04-17 ASSESSMENT — ENCOUNTER SYMPTOMS
PALPITATIONS: 0
PSYCHIATRIC NEGATIVE: 1
DIFFICULTY URINATING: 1
HEMATURIA: 0
STRIDOR: 0
DYSURIA: 0
ABDOMINAL DISTENTION: 1
ARTHRALGIAS: 1
ACTIVITY CHANGE: 0
LIGHT-HEADEDNESS: 0
BRUISES/BLEEDS EASILY: 0
BLOOD IN STOOL: 0
FATIGUE: 0
SHORTNESS OF BREATH: 0
COUGH: 0
WHEEZING: 0
NUMBNESS: 1
UNEXPECTED WEIGHT CHANGE: 1
WEAKNESS: 1
DIZZINESS: 0

## 2025-04-17 NOTE — PATIENT INSTRUCTIONS
Decrease metoprolol to 150 mg daily - sending prescription for 100 mg tablets to your pharmacy. Take 1 1/2 100 mg tablets daily    You can use up your remaining 200 mg tablets by cutting them in 1/2 and taking 1/2 of a 200 mg tablet, and `1/2 of a 100 mg tablet.

## 2025-04-17 NOTE — PROGRESS NOTES
Patient:  Ricardo Chew  YOB: 1955  MRN: [id number]       Chief Complaint/Active Symptoms:       Ricardo Chew is a 70 y.o. male who returns today for cardiac follow-up of persistent atrial fibrillation.    The patient is here for elective follow-up of his atrial fibrillation and chronic anticoagulation.  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  There was an open wound and a wound VAC is in place.  He was following up with the [facility] wound clinic until that heals.  He is hopeful from their comments to them that the wound VAC may be able to be removed next week.    He is tolerating his medications well.  He has no chest pain or discomfort and no shortness of breath at low workloads.  He has chronic bilateral lower extremity edema for which he wears wrapped stockings and he is told to keep his legs elevated which he finds difficult to do.  He has also had some wounds on his leg that are also being treated at the wound clinic.  He has no palpitations and no dizziness or lightheadedness.  He is taking his anticoagulation regularly and he has no problems with blood in the urine or stool or any excessive bleeding.    He states he has been screened for coronary artery disease in the past.  He moved here several years ago from [place] and he did have a stress test at 1 point in time he believes was about 6 or 7 years ago that he was told was normal.  He has never had a heart catheterization or told he had any congestive heart failure.      Review of Systems   Constitutional:  Positive for unexpected weight change. Negative for activity change (Sedentary) and fatigue.   HENT:  Positive for congestion. Negative for nosebleeds.    Respiratory:  Negative for cough, shortness of breath, wheezing and stridor.    Cardiovascular:  Positive for leg swelling. Negative for chest pain and palpitations.   Gastrointestinal:  Positive for abdominal  distention. Negative for blood in stool.   Genitourinary:  Positive for difficulty urinating. Negative for dysuria and hematuria.   Musculoskeletal:  Positive for arthralgias and gait problem.   Neurological:  Positive for weakness and numbness (Bilateral diabetic neuropathy). Negative for dizziness and light-headedness.   Hematological:  Does not bruise/bleed easily.   Psychiatric/Behavioral: Negative.     All other systems reviewed and are negative.      Objective:     Vitals:    04/17/25 0909   BP: 138/68   Pulse: 79   Temp: 36.7 °C (98 °F)       Vitals:    04/17/25 0909   Weight: 114 kg (252 lb 6.4 oz)       Allergies:     Allergies[1]       Medications:     Current Outpatient Medications   Medication Instructions    allopurinol (ZYLOPRIM) 100 mg, oral, Daily    amLODIPine (NORVASC) 10 mg, oral, Daily    atorvastatin (LIPITOR) 40 mg, oral, Daily, TAKE 1 TABLET Before meals    chlorthalidone (HYGROTON) 25 mg, oral, Daily    cholecalciferol (Vitamin D-3) 50 MCG (2000 UT) tablet 1 tablet, Daily    doxycycline (Vibramycin) 100 mg capsule 1 capsule, Every 12 hours scheduled (0630,1830)    Eliquis 5 mg, oral, 2 times daily    Farxiga 10 mg, oral, Daily    folic acid (FOLVITE) 0.8 mg, Daily    furosemide (LASIX) 20 mg, oral, Daily, Take 1 tablet daily    glipizide-metformin (Metaglip) 5-500 mg tablet 1 tablet, oral, 2 times daily    latanoprost (Xalatan) 0.005 % ophthalmic solution 1 drop, Nightly    lisinopril 40 mg, oral, Daily, Take 1 tablet daily    metoprolol succinate XL (TOPROL-XL) 150 mg, oral, Daily, Do not crush or chew. -    multivit-minerals/folic acid (CENTRUM ADULTS ORAL) Take by mouth.    pantoprazole (PROTONIX) 40 mg, oral, Daily       Physical Examination:   GENERAL:  Well developed, well nourished, in no acute distress.  HEENT: NC AT, EOMI with anicteric sclera  NECK:   no elevated JVD, no bruit.  CHEST:  Symmetric and nontender.  LUNGS:  Clear to auscultation bilaterally, normal respiratory effort.   "Diminished at the bases  HEART: PMI is not palpable.  Heart sounds are distant.  There is an irregular and bradycardic rhythm with a normal S1 and S2 no S3.  There is a soft systolic ejection murmur at the upper sternal border no diastolic murmur.  There are no carotid or abdominal bruits.  There is bilateral lower extremity edema with bilateral wraps in place.  Due to the edema and wraps pedal pulses are unable to be palpated.  ABDOMEN: Soft, NT, ND without palpable organomegaly or bruits  EXTREMITIES:  Warm with good color, no clubbing or cyanosis.  There is bilateral edema noted.  Right elbow has a wound VAC and dressing in place.  Left elbow has a large tophi type mass.  PERIPHERAL VASCULAR:  Pulses present and equally palpable in the upper extremities.  Distal lower extremities are unable to be obtained  MUSCULOSKELETAL: Osteoarthritic changes, ambulates slowly with a walker  NEURO/PSYCH:  Alert and oriented times three with approppriate behavior and responses. Nonfocal motor examination with normal gait and ambulation  Lymph: No significant palpable lymphadenopathy  Skin: no rash or lesions on exposed skin or reported.    Lab:     CBC:   Lab Results   Component Value Date    WBC 4.7 05/31/2024    RBC 3.45 (L) 05/31/2024    HGB 11.2 (L) 05/31/2024    HCT 33.6 (L) 05/31/2024     05/31/2024        CMP:    Lab Results   Component Value Date     05/31/2024    K 3.8 05/31/2024    CL 98 05/31/2024    CO2 25 05/31/2024    BUN 17 05/31/2024    CREATININE 0.79 05/31/2024    GLUCOSE 119 (H) 05/31/2024    CALCIUM 9.8 05/31/2024       Magnesium:    No results found for: \"MG\"    Lipid Profile:    Lab Results   Component Value Date    TRIG 126 05/31/2024    HDL 36.2 05/31/2024    LDLCALC 70 05/31/2024       TSH:    No results found for: \"TSH\"    BNP:   No results found for: \"BNP\"     PT/INR:    Lab Results   Component Value Date    INR 1.40 12/29/2023       HgBA1c:    Lab Results   Component Value Date    HGBA1C " "5.8 (H) 05/31/2024       BMP:  Lab Results   Component Value Date     05/31/2024    K 3.8 05/31/2024    CL 98 05/31/2024    CO2 25 05/31/2024    BUN 17 05/31/2024    CREATININE 0.79 05/31/2024       Cardiac Enzymes:    No results found for: \"TROPHS\"    Hepatic Function Panel:    Lab Results   Component Value Date    ALKPHOS 110 05/31/2024    ALT 27 05/31/2024    AST 25 05/31/2024    PROT 7.0 05/31/2024    BILITOT 1.5 (H) 05/31/2024     Labs reviewed    Diagnostic Studies:     No echocardiogram results found for the past 12 months.  Echocardiogram in 2023 showed normal EF of 60 to 65%, there is mild aortic valve gradients of 10 mmHg.  Mild to moderate tricuspid valve regurgitation.    No nuclear medicine results found for the past 12 months    No valid procedures specified.    EKG:   EKG today - atrial fibrillation with controlled ventricular response. NS T abn    Holter monitor from November 2023 was predominantly atrial fibrillation with an average rate of 70 bpm.    Radiology:     CT cardiac scoring wo IV contrast    (Results Pending)         ASSESSMENT     Problem List Items Addressed This Visit       Atrial fibrillation (Multi) - Primary    Relevant Medications    metoprolol succinate XL (Toprol-XL) 100 mg 24 hr tablet    Other Relevant Orders    ECG 12 lead (Clinic Performed) (Completed)    CT cardiac scoring wo IV contrast    Follow Up In Cardiology    Diastolic heart failure    Relevant Medications    metoprolol succinate XL (Toprol-XL) 100 mg 24 hr tablet    Hyperlipidemia    Hypertension    Relevant Orders    ECG 12 lead (Clinic Performed) (Completed)    CT cardiac scoring wo IV contrast    Follow Up In Cardiology    Varicose veins of lower extremity    Chronic anticoagulation    Aortic valve sclerosis    Relevant Medications    metoprolol succinate XL (Toprol-XL) 100 mg 24 hr tablet       PLAN   1.  Longstanding persistent atrial fibrillation on chronic anticoagulation.  I expect this patient " actually has permanent atrial fibrillation in the next year if he remains in A-fib we will change his designation.  He is tolerating his oral anticoagulation without visible blood loss and with only very mild anemia on his recent labs.  Will follow with CBC intermittently and we have asked the patient to notify us of any bleeding issues and proceed to the emergency room with any blood in the urine or stool.  His actual rates of his atrial fibrillation are slow.  This is also confirmed with a Holter monitor from November 2023 where his average heart rate was only 70 bpm.  I have decreased his metoprolol succinate from 200 to 150 mg daily.  If his resting heart rates remains low at his follow-up visit I will decrease him further from 150 to 100 mg daily.  If necessary a repeat Holter monitor will be obtained.  2.  Hypertension.  Blood pressures are borderline but acceptably controlled.  3.  Hyperlipidemia.  LDL cholesterol is at goal.  4.  Varicose veins.  Right now is being treated at the wound clinic will defer this for the present to the wound clinic and his primary care physician.  We have talked about chronic management with the patient.  5.  Valvular heart disease.  The patient has mild to moderate tricuspid valve regurgitation and aortic valve sclerosis.  Would consider an echocardiogram to be repeated in several years.  6.  Cardiovascular risk factors.  The patient's last screening evaluation for coronary artery disease was a stress test in [place] 6 or 7 years ago.  The patient is not sure when but it was more than 5 years.  Will ask for a CT cardiac score.  If that is elevated thousand or above would recommend repeat stress testing due to increased risk for multivessel coronary artery disease.  This may also affect the level of LDL cholesterol lowering we seek to obtain and will adjust his lipid-lowering regimen as well depending on those results.    In the absence of any problems we will see him in  follow-up in 6 months we have asked him return sooner if he has any problems or concerns.                     [1]   Allergies  Allergen Reactions    Ketorolac Other     Uncontrolled bloody nose    Clindamycin Hives

## 2025-04-23 ENCOUNTER — APPOINTMENT (OUTPATIENT)
Dept: NEPHROLOGY | Facility: CLINIC | Age: 70
End: 2025-04-23
Payer: MEDICARE

## 2025-04-23 VITALS
BODY MASS INDEX: 36.14 KG/M2 | HEART RATE: 69 BPM | DIASTOLIC BLOOD PRESSURE: 71 MMHG | SYSTOLIC BLOOD PRESSURE: 136 MMHG | WEIGHT: 244 LBS | HEIGHT: 69 IN

## 2025-04-23 DIAGNOSIS — I10 ESSENTIAL HYPERTENSION: ICD-10-CM

## 2025-04-23 DIAGNOSIS — E08.22 DIABETES MELLITUS DUE TO UNDERLYING CONDITION WITH DIABETIC CHRONIC KIDNEY DISEASE, UNSPECIFIED CKD STAGE, UNSPECIFIED WHETHER LONG TERM INSULIN USE: Primary | ICD-10-CM

## 2025-04-23 DIAGNOSIS — R80.9 ALBUMINURIA: ICD-10-CM

## 2025-04-23 DIAGNOSIS — E11.51 DIABETES MELLITUS WITH PERIPHERAL ARTERY DISEASE: ICD-10-CM

## 2025-04-23 PROCEDURE — 1036F TOBACCO NON-USER: CPT | Performed by: INTERNAL MEDICINE

## 2025-04-23 PROCEDURE — 3008F BODY MASS INDEX DOCD: CPT | Performed by: INTERNAL MEDICINE

## 2025-04-23 PROCEDURE — 3078F DIAST BP <80 MM HG: CPT | Performed by: INTERNAL MEDICINE

## 2025-04-23 PROCEDURE — 1159F MED LIST DOCD IN RCRD: CPT | Performed by: INTERNAL MEDICINE

## 2025-04-23 PROCEDURE — 3075F SYST BP GE 130 - 139MM HG: CPT | Performed by: INTERNAL MEDICINE

## 2025-04-23 PROCEDURE — 4010F ACE/ARB THERAPY RXD/TAKEN: CPT | Performed by: INTERNAL MEDICINE

## 2025-04-23 PROCEDURE — 99213 OFFICE O/P EST LOW 20 MIN: CPT | Performed by: INTERNAL MEDICINE

## 2025-04-23 NOTE — PROGRESS NOTES
"Ricardo hCew   70 y.o.    @@  [facility]/Room: [id number]/Room/bed info not found    Subjective:   The patient is being seen for a routine clinic follow-up of chronic kidney disease. Recently, the disease has been stable. Disease complications:  No hyperkalemia, no hypocalcemia, no hyperphosphatemia, no metabolic acidosis, no coagulopathy, no uremic encephalopathy, no neuropathy and no renal osteodystrophy. The patient is currently asymptomatic. No associated symptoms are reported.       Meds:   Current Medications[1]       ROS:  The patient is awake and oriented. No dizziness or lightheadedness. No chills and no fever. No headaches. No nausea and no vomiting. No shortness of breath. No cough. No chest pain. No abdominal pain. No diarrhea. No hematemesis or hemoptysis. No hematuria. No rectal bleeding. No melena. No epistaxis. No urinary symptoms. No flank pain. No leg edema. No itching. Overall, the rest of the review of systems is also negative.  12 point review of systems otherwise negative as stated in HPI.        Physical Examination:        Vitals:    04/23/25 1328   BP: 136/71   Pulse: 69     General: The patient is awake, oriented, and is not in any distress.  Head and Neck: Normocephalic. No periorbital edema.  Eyes: non-icteric  Respiratory: Symmetric chest expansion. No respiratory distress.  Skin: No maculopapular rash.  Musculoskeletal: No peripheral edema.  Neuro Exam: Speech is fluent. Moves extremities.    Imaging:         Blood Labs:  No results found for this or any previous visit (from the past 24 hours).   No results found for: \"BMPR1A\", \"PTH\", \"PROTUR\", \"PHOS\"   Lab Results   Component Value Date    GLUCOSE 119 (H) 05/31/2024    CALCIUM 9.8 05/31/2024     05/31/2024    K 3.8 05/31/2024    CO2 25 05/31/2024    CL 98 05/31/2024    BUN 17 05/31/2024    CREATININE 0.79 05/31/2024         Assessment and Plan:  1 proteinuria.  The patient has long history of diabetes.  He has about 2.3 g proteinuria " based on last spot urine protein creatinine ratio.  This is most likely because of diabetic nephropathy.  He is on maximum dose of lisinopril and Farxiga last creatinine level is 0.9.     2.  Hypertension.  Blood pressure is acceptable.  I added chlorthalidone to his medications.     3.  Diabetes.     I will see him in about 6 months for follow-up.          Blaine Jackson MD  Senior Attending Physician  Director of Onco-Nephrology Program  Division of Nephrology & Hypertension  [facility]       [1]   Current Outpatient Medications   Medication Sig Dispense Refill    allopurinol (Zyloprim) 100 mg tablet Take 1 tablet (100 mg) by mouth once daily. 90 tablet 0    amLODIPine (Norvasc) 10 mg tablet Take 1 tablet (10 mg) by mouth once daily. 90 tablet 3    atorvastatin (Lipitor) 40 mg tablet Take 1 tablet (40 mg) by mouth once daily. TAKE 1 TABLET Before meals 90 tablet 3    chlorthalidone (Hygroton) 25 mg tablet Take 1 tablet (25 mg) by mouth once daily. 90 tablet 3    cholecalciferol (Vitamin D-3) 50 MCG (2000 UT) tablet Take 1 tablet (50 mcg) by mouth once daily.      doxycycline (Vibramycin) 100 mg capsule Take 1 capsule (100 mg) by mouth every 12 hours.      Eliquis 5 mg tablet TAKE 1 TABLET BY MOUTH TWICE A  tablet 1    Farxiga 10 mg TAKE 1 TABLET BY MOUTH EVERY DAY 90 tablet 3    folic acid (Folvite) 800 mcg tablet Take 1 tablet (0.8 mg) by mouth once daily.      furosemide (Lasix) 20 mg tablet Take 1 tablet (20 mg) by mouth once daily. Take 1 tablet daily 90 tablet 3    glipizide-metformin (Metaglip) 5-500 mg tablet Take 1 tablet by mouth 2 times a day. 180 tablet 2    latanoprost (Xalatan) 0.005 % ophthalmic solution 1 drop once daily at bedtime.      lisinopril 40 mg tablet Take 1 tablet (40 mg) by mouth once daily. Take 1 tablet daily 90 tablet 3    metoprolol succinate XL (Toprol-XL) 100 mg 24 hr tablet Take 1.5 tablets (150 mg) by mouth once daily. Do not crush or  chew. - 135 tablet 3    multivit-minerals/folic acid (CENTRUM ADULTS ORAL) Take by mouth.      pantoprazole (ProtoNix) 40 mg EC tablet Take 1 tablet (40 mg) by mouth once daily. 90 tablet 2     No current facility-administered medications for this visit.

## 2025-04-25 LAB
NON-UH HIE BUN/CREAT RATIO: 30
NON-UH HIE BUN: 27 MG/DL (ref 9–23)
NON-UH HIE CALCIUM: 10.1 MG/DL (ref 8.7–10.4)
NON-UH HIE CALCULATED OSMOLALITY: 271 MOSM/KG (ref 275–295)
NON-UH HIE CHLORIDE: 91 MMOL/L (ref 98–107)
NON-UH HIE CO2, VENOUS: 25 MMOL/L (ref 20–31)
NON-UH HIE CREATININE RANDOM, U: 21.1 MG/DL
NON-UH HIE CREATININE: 0.9 MG/DL (ref 0.6–1.1)
NON-UH HIE GFR AA: >60
NON-UH HIE GLOMERULAR FILTRATION RATE: >60 ML/MIN/1.73M?
NON-UH HIE GLUCOSE: 123 MG/DL (ref 74–106)
NON-UH HIE K: 3.6 MMOL/L (ref 3.5–5.1)
NON-UH HIE NA: 132 MMOL/L (ref 135–145)
NON-UH HIE TOTAL PROTEIN, RANDOM URINE: 18 MG/DL (ref 1–14)

## 2025-05-14 ENCOUNTER — APPOINTMENT (OUTPATIENT)
Dept: NEPHROLOGY | Facility: CLINIC | Age: 70
End: 2025-05-14
Payer: MEDICARE

## 2025-06-10 ENCOUNTER — HOSPITAL ENCOUNTER (OUTPATIENT)
Dept: RADIOLOGY | Facility: CLINIC | Age: 70
Discharge: HOME | End: 2025-06-10
Payer: MEDICARE

## 2025-06-10 DIAGNOSIS — I48.11 LONGSTANDING PERSISTENT ATRIAL FIBRILLATION (MULTI): ICD-10-CM

## 2025-06-10 DIAGNOSIS — I10 PRIMARY HYPERTENSION: ICD-10-CM

## 2025-06-10 PROCEDURE — 75571 CT HRT W/O DYE W/CA TEST: CPT

## 2025-06-15 DIAGNOSIS — E11.51 TYPE 2 DIABETES MELLITUS WITH DIABETIC PERIPHERAL ANGIOPATHY WITHOUT GANGRENE, WITHOUT LONG-TERM CURRENT USE OF INSULIN (MULTI): ICD-10-CM

## 2025-06-15 DIAGNOSIS — R93.1 AGATSTON CAC SCORE, >400: Primary | ICD-10-CM

## 2025-06-15 DIAGNOSIS — R94.31 ABNORMAL ELECTROCARDIOGRAM (ECG) (EKG): ICD-10-CM

## 2025-06-15 DIAGNOSIS — E78.2 MIXED HYPERLIPIDEMIA: ICD-10-CM

## 2025-06-15 RX ORDER — AMINOPHYLLINE 25 MG/ML
125 INJECTION, SOLUTION INTRAVENOUS ONCE AS NEEDED
OUTPATIENT
Start: 2025-06-15

## 2025-06-15 RX ORDER — REGADENOSON 0.08 MG/ML
0.4 INJECTION, SOLUTION INTRAVENOUS
OUTPATIENT
Start: 2025-06-15

## 2025-06-15 RX ORDER — ATORVASTATIN CALCIUM 80 MG/1
80 TABLET, FILM COATED ORAL DAILY
Qty: 90 TABLET | Refills: 3 | Status: SHIPPED | OUTPATIENT
Start: 2025-06-15 | End: 2026-06-15

## 2025-06-16 ENCOUNTER — TELEPHONE (OUTPATIENT)
Dept: CARDIOLOGY | Facility: CLINIC | Age: 70
End: 2025-06-16
Payer: MEDICARE

## 2025-06-16 NOTE — TELEPHONE ENCOUNTER
----- Message from Maribel Downing sent at 6/15/2025  4:59 PM EDT -----  Please call patient with test results. CT cardiac score >3000 putting patient at high risk for a heart attack. Lexiscan nuclear stress test ordered (patient should have no caffeine for 24 hours prior   to the stress test), and I increased his atorvastatin to 80 mg daily . See me after the stress test.   ----- Message -----  From: Interface, Radiology Results In  Sent: 6/11/2025   8:17 AM EDT  To: Maribel Downing MD

## 2025-07-02 ENCOUNTER — HOSPITAL ENCOUNTER (OUTPATIENT)
Dept: RADIOLOGY | Facility: CLINIC | Age: 70
Discharge: HOME | End: 2025-07-02
Payer: MEDICARE

## 2025-07-02 ENCOUNTER — HOSPITAL ENCOUNTER (OUTPATIENT)
Dept: CARDIOLOGY | Facility: CLINIC | Age: 70
Discharge: HOME | End: 2025-07-02
Payer: MEDICARE

## 2025-07-02 DIAGNOSIS — R94.31 ABNORMAL ELECTROCARDIOGRAM (ECG) (EKG): ICD-10-CM

## 2025-07-02 DIAGNOSIS — R93.1 AGATSTON CAC SCORE, >400: ICD-10-CM

## 2025-07-02 PROCEDURE — A9502 TC99M TETROFOSMIN: HCPCS | Performed by: INTERNAL MEDICINE

## 2025-07-02 PROCEDURE — 78452 HT MUSCLE IMAGE SPECT MULT: CPT

## 2025-07-02 PROCEDURE — 2500000004 HC RX 250 GENERAL PHARMACY W/ HCPCS (ALT 636 FOR OP/ED): Performed by: INTERNAL MEDICINE

## 2025-07-02 PROCEDURE — 3430000001 HC RX 343 DIAGNOSTIC RADIOPHARMACEUTICALS: Performed by: INTERNAL MEDICINE

## 2025-07-02 PROCEDURE — 93017 CV STRESS TEST TRACING ONLY: CPT

## 2025-07-02 RX ORDER — REGADENOSON 0.08 MG/ML
0.4 INJECTION, SOLUTION INTRAVENOUS
Status: COMPLETED | OUTPATIENT
Start: 2025-07-02 | End: 2025-07-02

## 2025-07-02 RX ADMIN — TETROFOSMIN 34.6 MILLICURIE: 0.23 INJECTION, POWDER, LYOPHILIZED, FOR SOLUTION INTRAVENOUS at 14:07

## 2025-07-02 RX ADMIN — TETROFOSMIN 11 MILLICURIE: 0.23 INJECTION, POWDER, LYOPHILIZED, FOR SOLUTION INTRAVENOUS at 12:15

## 2025-07-02 RX ADMIN — REGADENOSON 0.4 MG: 0.08 INJECTION, SOLUTION INTRAVENOUS at 14:05

## 2025-10-21 ENCOUNTER — APPOINTMENT (OUTPATIENT)
Dept: NEPHROLOGY | Facility: CLINIC | Age: 70
End: 2025-10-21
Payer: MEDICARE

## 2025-10-23 ENCOUNTER — APPOINTMENT (OUTPATIENT)
Dept: CARDIOLOGY | Facility: CLINIC | Age: 70
End: 2025-10-23
Payer: MEDICARE